# Patient Record
Sex: MALE | Employment: FULL TIME | URBAN - METROPOLITAN AREA
[De-identification: names, ages, dates, MRNs, and addresses within clinical notes are randomized per-mention and may not be internally consistent; named-entity substitution may affect disease eponyms.]

---

## 2020-04-17 PROBLEM — E10.9 TYPE 1 DIABETES MELLITUS WITHOUT COMPLICATION (HCC): Status: ACTIVE | Noted: 2020-04-17

## 2020-07-08 ENCOUNTER — APPOINTMENT (OUTPATIENT)
Dept: CT IMAGING | Facility: HOSPITAL | Age: 25
End: 2020-07-08
Attending: EMERGENCY MEDICINE
Payer: COMMERCIAL

## 2020-07-08 ENCOUNTER — HOSPITAL ENCOUNTER (EMERGENCY)
Facility: HOSPITAL | Age: 25
Discharge: HOME | End: 2020-07-08
Attending: EMERGENCY MEDICINE
Payer: COMMERCIAL

## 2020-07-08 VITALS
WEIGHT: 150 LBS | OXYGEN SATURATION: 95 % | BODY MASS INDEX: 20.32 KG/M2 | TEMPERATURE: 98 F | HEIGHT: 72 IN | RESPIRATION RATE: 18 BRPM | HEART RATE: 58 BPM | SYSTOLIC BLOOD PRESSURE: 126 MMHG | DIASTOLIC BLOOD PRESSURE: 72 MMHG

## 2020-07-08 DIAGNOSIS — F10.10 ALCOHOL ABUSE: ICD-10-CM

## 2020-07-08 DIAGNOSIS — R10.9 ABDOMINAL PAIN OF UNKNOWN ETIOLOGY: Primary | ICD-10-CM

## 2020-07-08 DIAGNOSIS — K92.1 BLOOD IN STOOL: ICD-10-CM

## 2020-07-08 LAB
ALBUMIN SERPL-MCNC: 4.2 G/DL (ref 3.4–5)
ALBUMIN/GLOB SERPL: 1.3 {RATIO} (ref 1–2)
ALP LIVER SERPL-CCNC: 71 U/L (ref 45–117)
ALT SERPL-CCNC: 24 U/L (ref 16–61)
ANION GAP SERPL CALC-SCNC: 1 MMOL/L (ref 0–18)
AST SERPL-CCNC: 11 U/L (ref 15–37)
BASOPHILS # BLD AUTO: 0.07 X10(3) UL (ref 0–0.2)
BASOPHILS NFR BLD AUTO: 1 %
BILIRUB SERPL-MCNC: 0.6 MG/DL (ref 0.1–2)
BILIRUB UR QL STRIP.AUTO: NEGATIVE
BUN BLD-MCNC: 11 MG/DL (ref 7–18)
BUN/CREAT SERPL: 13.4 (ref 10–20)
CALCIUM BLD-MCNC: 9.6 MG/DL (ref 8.5–10.1)
CHLORIDE SERPL-SCNC: 102 MMOL/L (ref 98–112)
CLARITY UR REFRACT.AUTO: CLEAR
CO2 SERPL-SCNC: 32 MMOL/L (ref 21–32)
COLOR UR AUTO: YELLOW
CREAT BLD-MCNC: 0.82 MG/DL (ref 0.7–1.3)
DEPRECATED RDW RBC AUTO: 40.5 FL (ref 35.1–46.3)
EOSINOPHIL # BLD AUTO: 0.12 X10(3) UL (ref 0–0.7)
EOSINOPHIL NFR BLD AUTO: 1.7 %
ERYTHROCYTE [DISTWIDTH] IN BLOOD BY AUTOMATED COUNT: 11.8 % (ref 11–15)
GLOBULIN PLAS-MCNC: 3.3 G/DL (ref 2.8–4.4)
GLUCOSE BLD-MCNC: 200 MG/DL (ref 70–99)
GLUCOSE UR STRIP.AUTO-MCNC: >=500 MG/DL
HCT VFR BLD AUTO: 40.9 % (ref 39–53)
HGB BLD-MCNC: 14.4 G/DL (ref 13–17.5)
IMM GRANULOCYTES # BLD AUTO: 0.04 X10(3) UL (ref 0–1)
IMM GRANULOCYTES NFR BLD: 0.6 %
KETONES UR STRIP.AUTO-MCNC: NEGATIVE MG/DL
LEUKOCYTE ESTERASE UR QL STRIP.AUTO: NEGATIVE
LIPASE SERPL-CCNC: 65 U/L (ref 73–393)
LYMPHOCYTES # BLD AUTO: 2.09 X10(3) UL (ref 1–4)
LYMPHOCYTES NFR BLD AUTO: 30.1 %
M PROTEIN MFR SERPL ELPH: 7.5 G/DL (ref 6.4–8.2)
MCH RBC QN AUTO: 32.7 PG (ref 26–34)
MCHC RBC AUTO-ENTMCNC: 35.2 G/DL (ref 31–37)
MCV RBC AUTO: 92.7 FL (ref 80–100)
MONOCYTES # BLD AUTO: 0.76 X10(3) UL (ref 0.1–1)
MONOCYTES NFR BLD AUTO: 11 %
NEUTROPHILS # BLD AUTO: 3.86 X10 (3) UL (ref 1.5–7.7)
NEUTROPHILS # BLD AUTO: 3.86 X10(3) UL (ref 1.5–7.7)
NEUTROPHILS NFR BLD AUTO: 55.6 %
NITRITE UR QL STRIP.AUTO: NEGATIVE
OSMOLALITY SERPL CALC.SUM OF ELEC: 285 MOSM/KG (ref 275–295)
PH UR STRIP.AUTO: 6 [PH] (ref 4.5–8)
PLATELET # BLD AUTO: 203 10(3)UL (ref 150–450)
POTASSIUM SERPL-SCNC: 3.9 MMOL/L (ref 3.5–5.1)
PROT UR STRIP.AUTO-MCNC: NEGATIVE MG/DL
RBC # BLD AUTO: 4.41 X10(6)UL (ref 4.3–5.7)
RBC UR QL AUTO: NEGATIVE
SODIUM SERPL-SCNC: 135 MMOL/L (ref 136–145)
SP GR UR STRIP.AUTO: 1.01 (ref 1–1.03)
UROBILINOGEN UR STRIP.AUTO-MCNC: <2 MG/DL
WBC # BLD AUTO: 6.9 X10(3) UL (ref 4–11)

## 2020-07-08 PROCEDURE — 74177 CT ABD & PELVIS W/CONTRAST: CPT | Performed by: EMERGENCY MEDICINE

## 2020-07-08 PROCEDURE — 81003 URINALYSIS AUTO W/O SCOPE: CPT | Performed by: EMERGENCY MEDICINE

## 2020-07-08 PROCEDURE — 99284 EMERGENCY DEPT VISIT MOD MDM: CPT

## 2020-07-08 PROCEDURE — 80053 COMPREHEN METABOLIC PANEL: CPT | Performed by: EMERGENCY MEDICINE

## 2020-07-08 PROCEDURE — 76705 ECHO EXAM OF ABDOMEN: CPT

## 2020-07-08 PROCEDURE — 85025 COMPLETE CBC W/AUTO DIFF WBC: CPT | Performed by: EMERGENCY MEDICINE

## 2020-07-08 PROCEDURE — 83690 ASSAY OF LIPASE: CPT | Performed by: EMERGENCY MEDICINE

## 2020-07-08 PROCEDURE — 36415 COLL VENOUS BLD VENIPUNCTURE: CPT

## 2020-07-08 RX ORDER — DICYCLOMINE HCL 20 MG
20 TABLET ORAL 3 TIMES DAILY
Qty: 20 TABLET | Refills: 0 | Status: SHIPPED | OUTPATIENT
Start: 2020-07-08 | End: 2020-07-16

## 2020-07-08 RX ORDER — OMEPRAZOLE 40 MG/1
40 CAPSULE, DELAYED RELEASE ORAL DAILY
Qty: 30 CAPSULE | Refills: 0 | Status: SHIPPED | OUTPATIENT
Start: 2020-07-08 | End: 2020-08-07

## 2020-07-08 NOTE — ED INITIAL ASSESSMENT (HPI)
Patient with c/o bloody stool this AM. Patient has been having generalized abdominal pain x1 month. Vomited x1 on Sunday. Patient admits to drinking heavily, drinks on a \"good day\" 12-14 beers.

## 2020-07-08 NOTE — ED PROVIDER NOTES
Patient Seen in: [facility] Emergency Department      History   Patient presents with:  Abdomen/Flank Pain    Stated Complaint: abdominal pain for a month, bloody stool today.      HPI    Patient is a 25-year-old male comes in emergency room for eval Positive for stated complaint: abdominal pain for a month, bloody stool today. Other systems are as noted in HPI. Constitutional and vital signs reviewed. All other systems reviewed and negative except as noted above.     Physical Exam     ED Triage LIPASE - Abnormal; Notable for the following components:    Lipase 65 (*)     All other components within normal limits   CBC WITH DIFFERENTIAL WITH PLATELET    Narrative:      The following orders were created for panel order CBC WITH DIFFERENTIAL WITH ASHLY PROCEDURE:  CT ABDOMEN PELVIS IV CONTRAST, NO ORAL (ER)  COMPARISON:  None. INDICATIONS:  abdominal pain for a month, bloody stool today. Heavy drinking, sometimes 12-14 servings of beer in 1 day.   TECHNIQUE:  CT scanning was performed from the dome of t Finalized by: Neena Tariq MD on 7/08/2020 at 4:48 PM              First image transverse gallbladder without stones. Second image common bile duct 0.29 cm. Third image longitudinal gallbladder without stones.   GB wall 2mm    Indication:  A Patient is a 61-year-old male comes in emergency room for evaluation of abdominal pain for 2 months. CT scan shows no acute findings. Patient is a heavy drinker. Recommend alcohol cessation. Will be placed on Prilosec. Bentyl for pain at home.   Some b Omeprazole 40 MG Oral Capsule Delayed Release  Take 1 capsule (40 mg total) by mouth daily. Qty: 30 capsule Refills: 0    Dicyclomine HCl 20 MG Oral Tab  Take 1 tablet (20 mg total) by mouth 3 (three) times daily.   Qty: 20 tablet Refills: 0

## 2020-07-22 PROBLEM — E10.9 TYPE 1 DIABETES MELLITUS (HCC): Status: ACTIVE | Noted: 2018-04-26

## 2020-07-22 PROBLEM — F19.21 HISTORY OF DRUG DEPENDENCE (HCC): Status: ACTIVE | Noted: 2018-04-26

## 2021-09-23 PROBLEM — F43.10 PTSD (POST-TRAUMATIC STRESS DISORDER): Status: ACTIVE | Noted: 2021-09-23

## 2025-07-06 ENCOUNTER — APPOINTMENT (OUTPATIENT)
Dept: GENERAL RADIOLOGY | Age: 30
End: 2025-07-06
Payer: COMMERCIAL

## 2025-07-06 ENCOUNTER — HOSPITAL ENCOUNTER (EMERGENCY)
Age: 30
Discharge: HOME | End: 2025-07-06
Attending: EMERGENCY MEDICINE
Payer: COMMERCIAL

## 2025-07-06 VITALS
BODY MASS INDEX: 20.54 KG/M2 | OXYGEN SATURATION: 100 % | WEIGHT: 155 LBS | RESPIRATION RATE: 18 BRPM | SYSTOLIC BLOOD PRESSURE: 121 MMHG | TEMPERATURE: 98 F | HEART RATE: 86 BPM | HEIGHT: 73 IN | DIASTOLIC BLOOD PRESSURE: 74 MMHG
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
## 2025-07-06 DIAGNOSIS — S51.851A DOG BITE OF RIGHT FOREARM, INITIAL ENCOUNTER: Primary | ICD-10-CM

## 2025-07-06 PROCEDURE — 99284 EMERGENCY DEPT VISIT MOD MDM: CPT

## 2025-07-06 PROCEDURE — 12002 RPR S/N/AX/GEN/TRNK2.6-7.5CM: CPT

## 2025-07-06 PROCEDURE — 73090 X-RAY EXAM OF FOREARM: CPT | Performed by: EMERGENCY MEDICINE

## 2025-07-06 PROCEDURE — 90471 IMMUNIZATION ADMIN: CPT

## 2025-07-06 NOTE — DISCHARGE INSTRUCTIONS
Keep wound clean and dry.   Do not get stitches wet for the first 24 hours.   After this wash with soap and water twice a day.   Apply triple antibiotic ointment twice a day for the 3 days.   Then leave open to air as the oxygen will help it to heal.   Take Tylenol and/or ibuprofen as needed for pain.   Have the stitches removed in 10-14 days.   Watch for any increased redness, swelling, or drainage.   Follow up with your PCP in 2-3 days.     Thank you for choosing [facility] for your care.

## 2025-07-06 NOTE — ED PROVIDER NOTES
Patient Seen in: [facility] In [place]        History  Chief Complaint   Patient presents with    Bite     Stated Complaint: dog bite to right arm    Subjective:   29-year-old male presents to the emergency department with complaint of dog bite.  Patient states he was at a friend's house helping with a chore when the dog came out and attacked him.  The friend stated that his dog is up-to-date with immunizations.  Patient states he is unsure when his last tetanus shot was received.  He does have a history of depression, polysubstance dependence, and type 1 diabetes.  He denies any other injury, numbness, or tingling.    The history is provided by the patient.                   Objective:     Past Medical History:    Depression    Polysubstance dependence (HCC)    Type 1 diabetes mellitus (HCC)              Past Surgical History:   Procedure Laterality Date    Hc implant ear tubes      as a child    Other surgical history      Other surgical history      Tonsillectomy                  Social History     Socioeconomic History    Marital status: Single   Tobacco Use    Smoking status: Every Day     Current packs/day: 1.00     Average packs/day: 1 pack/day for 11.0 years (11.0 ttl pk-yrs)     Types: Cigarettes    Smokeless tobacco: Never    Tobacco comments:     smoked since third grade   Vaping Use    Vaping status: Never Used   Substance and Sexual Activity    Alcohol use: Yes     Alcohol/week: 42.0 standard drinks of alcohol     Types: 42 Cans of beer per week    Drug use: Yes     Types: benzodiazepines, Cannabis, PCP     Comment: cocaine,marijuana,abuse of benzodiazepines                                Physical Exam    ED Triage Vitals [07/06/25 1238]   /74   Pulse 86   Resp 18   Temp 97.8 °F (36.6 °C)   Temp src Temporal   SpO2 100 %   O2 Device None (Room air)       Current Vitals:   Vital Signs  BP: 121/74  Pulse: 86  Resp: 18  Temp: 97.8 °F (36.6 °C)  Temp src: Temporal    Oxygen  Therapy  SpO2: 100 %  O2 Device: None (Room air)            Physical Exam  Vitals and nursing note reviewed.   Constitutional:       General: He is not in acute distress.     Appearance: Normal appearance. He is normal weight. He is not ill-appearing.   HENT:      Head: Normocephalic and atraumatic.      Nose: Nose normal.      Mouth/Throat:      Mouth: Mucous membranes are moist.      Pharynx: Oropharynx is clear.   Eyes:      Conjunctiva/sclera: Conjunctivae normal.   Cardiovascular:      Rate and Rhythm: Normal rate and regular rhythm.      Pulses: Normal pulses.      Heart sounds: Normal heart sounds.   Pulmonary:      Effort: Pulmonary effort is normal. No respiratory distress.      Breath sounds: Normal breath sounds.   Musculoskeletal:         General: Swelling, tenderness and signs of injury present. No deformity. Normal range of motion.      Comments: 6 cm jagged and gaping laceration to the right ventral forearm.  This does extend past the subcutaneous tissue but does not affect the muscle.  ROM, motor strength and sensation intact.  Cap refill <2 sec and radial pulse 2+.     Skin:     General: Skin is warm and dry.      Capillary Refill: Capillary refill takes less than 2 seconds.   Neurological:      General: No focal deficit present.      Mental Status: He is alert and oriented to person, place, and time.   Psychiatric:         Mood and Affect: Mood normal.         Behavior: Behavior normal.          ED Course  Labs Reviewed - No data to display    ED Course as of 07/06/25 1424  ------------------------------------------------------------  Time: 07/06 1330  Value: XR FOREARM (2 VIEWS), RIGHT (CPT=73090)  Comment: CONCLUSION:     Negative for fracture or malalignment. Normal mineralization. Joint spaces are preserved. Soft tissue defect noted along the ventral aspect of the midforearm. No radiopaque foreign body.                      MDM       Medical Decision Making  28 yo male with dog bite of right  forearm.  X-ray ordered in triage.  Tetanus and Asepsis ordered.  Verbal consent received for repair.      Laceration was anesthetized with lidocaine with epinephrine locally.  The wound was cleansed and irrigated copiously.  There was no visible foreign body within the wound. The wound was closed using 12 simple interrupted loosely approximated sutures 4-0 Prolene.  The quality of the closure was adequate.  Patient tolerated procedure fair.  Dressing applied.     Augmentin was prescribed for prophylactic of infection.  Note was given for work.  Discussed with patient and parent supportive management at home including wound care.  Recommend patient follow-up with his PCP in the next 2 to 3 days for wound recheck.  Sutures can be removed in 10 to 14 days.      Amount and/or Complexity of Data Reviewed  Radiology: ordered. Decision-making details documented in ED Course.    Risk  OTC drugs.  Prescription drug management.        Disposition and Plan     Clinical Impression:  1. Dog bite of right forearm, initial encounter         Disposition:  Discharge  7/6/2025  2:13 pm    Follow-up:  Slade Yi MD  [address] [place] [id number]  [phone]    Follow up in 2 day(s)  For wound re-check          Medications Prescribed:  Discharge Medication List as of 7/6/2025  2:14 PM        START taking these medications    Details   amoxicillin clavulanate 875-125 MG Oral Tab Take 1 tablet by mouth 2 (two) times daily for 7 days., Normal, Disp-14 tablet, R-0                   Supplementary Documentation:

## 2025-07-08 ENCOUNTER — APPOINTMENT (OUTPATIENT)
Dept: CT IMAGING | Age: 30
End: 2025-07-08
Attending: STUDENT IN AN ORGANIZED HEALTH CARE EDUCATION/TRAINING PROGRAM

## 2025-07-08 ENCOUNTER — HOSPITAL ENCOUNTER (INPATIENT)
Facility: HOSPITAL | Age: 30
LOS: 2 days | Discharge: HOME | End: 2025-07-10
Attending: HOSPITALIST | Admitting: HOSPITALIST

## 2025-07-08 ENCOUNTER — HOSPITAL ENCOUNTER (INPATIENT)
Facility: HOSPITAL | Age: 30
LOS: 2 days | Discharge: HOME | DRG: 603 | End: 2025-07-10
Attending: HOSPITALIST | Admitting: HOSPITALIST

## 2025-07-08 ENCOUNTER — APPOINTMENT (OUTPATIENT)
Dept: CT IMAGING | Age: 30
DRG: 603 | End: 2025-07-08
Attending: STUDENT IN AN ORGANIZED HEALTH CARE EDUCATION/TRAINING PROGRAM

## 2025-07-08 DIAGNOSIS — L02.413 ABSCESS OF RIGHT FOREARM: ICD-10-CM

## 2025-07-08 DIAGNOSIS — L08.9 DOG BITE OF RIGHT FOREARM WITH INFECTION, INITIAL ENCOUNTER: Primary | ICD-10-CM

## 2025-07-08 DIAGNOSIS — S51.851A DOG BITE OF RIGHT FOREARM WITH INFECTION, INITIAL ENCOUNTER: Primary | ICD-10-CM

## 2025-07-08 DIAGNOSIS — Z53.29 LEFT AGAINST MEDICAL ADVICE: ICD-10-CM
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
## 2025-07-08 DIAGNOSIS — L03.113 CELLULITIS OF RIGHT UPPER EXTREMITY: ICD-10-CM

## 2025-07-08 LAB
ALBUMIN SERPL-MCNC: 4.8 G/DL (ref 3.2–4.8)
ALBUMIN/GLOB SERPL: 2 (ref 1–2)
ALP LIVER SERPL-CCNC: 94 U/L (ref 45–117)
ALT SERPL-CCNC: 25 U/L (ref 10–49)
ANION GAP SERPL CALC-SCNC: 7 MMOL/L (ref 0–18)
AST SERPL-CCNC: 22 U/L (ref ?–34)
BASOPHILS # BLD AUTO: 0.03 X10(3) UL (ref 0–0.2)
BASOPHILS NFR BLD AUTO: 0.4 %
BILIRUB SERPL-MCNC: 0.5 MG/DL (ref 0.3–1.2)
BUN BLD-MCNC: 10 MG/DL (ref 9–23)
CALCIUM BLD-MCNC: 9.7 MG/DL (ref 8.7–10.6)
CHLORIDE SERPL-SCNC: 100 MMOL/L (ref 98–112)
CO2 SERPL-SCNC: 30 MMOL/L (ref 21–32)
CREAT BLD-MCNC: 0.85 MG/DL (ref 0.7–1.3)
EGFRCR SERPLBLD CKD-EPI 2021: 121 ML/MIN/1.73M2 (ref 60–?)
EOSINOPHIL # BLD AUTO: 0.07 X10(3) UL (ref 0–0.7)
EOSINOPHIL NFR BLD AUTO: 1 %
ERYTHROCYTE [DISTWIDTH] IN BLOOD BY AUTOMATED COUNT: 12.2 %
GLOBULIN PLAS-MCNC: 2.4 G/DL (ref 2–3.5)
GLUCOSE BLD-MCNC: 165 MG/DL (ref 70–99)
GLUCOSE BLD-MCNC: 376 MG/DL (ref 70–99)
HCT VFR BLD AUTO: 40.4 % (ref 39–53)
HGB BLD-MCNC: 13.4 G/DL (ref 13–17.5)
IMM GRANULOCYTES # BLD AUTO: 0.02 X10(3) UL (ref 0–1)
IMM GRANULOCYTES NFR BLD: 0.3 %
LACTATE SERPL-SCNC: 1.1 MMOL/L (ref 0.5–2)
LACTATE SERPL-SCNC: 2.3 MMOL/L (ref 0.5–2)
LYMPHOCYTES # BLD AUTO: 1.46 X10(3) UL (ref 1–4)
LYMPHOCYTES NFR BLD AUTO: 21.1 %
MCH RBC QN AUTO: 30.5 PG (ref 26–34)
MCHC RBC AUTO-ENTMCNC: 33.2 G/DL (ref 31–37)
MCV RBC AUTO: 91.8 FL (ref 80–100)
MONOCYTES # BLD AUTO: 0.69 X10(3) UL (ref 0.1–1)
MONOCYTES NFR BLD AUTO: 10 %
NEUTROPHILS # BLD AUTO: 4.66 X10 (3) UL (ref 1.5–7.7)
NEUTROPHILS # BLD AUTO: 4.66 X10(3) UL (ref 1.5–7.7)
NEUTROPHILS NFR BLD AUTO: 67.2 %
OSMOLALITY SERPL CALC.SUM OF ELEC: 287 MOSM/KG (ref 275–295)
PLATELET # BLD AUTO: 236 10(3)UL (ref 150–450)
POTASSIUM SERPL-SCNC: 4.4 MMOL/L (ref 3.5–5.1)
PROT SERPL-MCNC: 7.2 G/DL (ref 5.7–8.2)
RBC # BLD AUTO: 4.4 X10(6)UL (ref 4.3–5.7)
SODIUM SERPL-SCNC: 137 MMOL/L (ref 136–145)
WBC # BLD AUTO: 6.9 X10(3) UL (ref 4–11)

## 2025-07-08 PROCEDURE — 96361 HYDRATE IV INFUSION ADD-ON: CPT

## 2025-07-08 PROCEDURE — 83036 HEMOGLOBIN GLYCOSYLATED A1C: CPT | Performed by: HOSPITALIST

## 2025-07-08 PROCEDURE — 36415 COLL VENOUS BLD VENIPUNCTURE: CPT

## 2025-07-08 PROCEDURE — 85025 COMPLETE CBC W/AUTO DIFF WBC: CPT | Performed by: NURSE PRACTITIONER

## 2025-07-08 PROCEDURE — 73201 CT UPPER EXTREMITY W/DYE: CPT | Performed by: STUDENT IN AN ORGANIZED HEALTH CARE EDUCATION/TRAINING PROGRAM

## 2025-07-08 PROCEDURE — 87040 BLOOD CULTURE FOR BACTERIA: CPT | Performed by: NURSE PRACTITIONER

## 2025-07-08 PROCEDURE — 96365 THER/PROPH/DIAG IV INF INIT: CPT

## 2025-07-08 PROCEDURE — 99285 EMERGENCY DEPT VISIT HI MDM: CPT

## 2025-07-08 PROCEDURE — 80053 COMPREHEN METABOLIC PANEL: CPT | Performed by: NURSE PRACTITIONER

## 2025-07-08 PROCEDURE — 82962 GLUCOSE BLOOD TEST: CPT

## 2025-07-08 PROCEDURE — 83605 ASSAY OF LACTIC ACID: CPT | Performed by: NURSE PRACTITIONER

## 2025-07-08 PROCEDURE — 96375 TX/PRO/DX INJ NEW DRUG ADDON: CPT

## 2025-07-08 RX ORDER — POLYETHYLENE GLYCOL 3350 17 G/17G
17 POWDER, FOR SOLUTION ORAL DAILY PRN
Status: DISCONTINUED | OUTPATIENT
Start: 2025-07-08 | End: 2025-07-10

## 2025-07-08 RX ORDER — DEXTROSE MONOHYDRATE 25 G/50ML
50 INJECTION, SOLUTION INTRAVENOUS
Status: DISCONTINUED | OUTPATIENT
Start: 2025-07-08 | End: 2025-07-10

## 2025-07-08 RX ORDER — ONDANSETRON 2 MG/ML
4 INJECTION INTRAMUSCULAR; INTRAVENOUS EVERY 4 HOURS PRN
Status: DISCONTINUED | OUTPATIENT
Start: 2025-07-08 | End: 2025-07-08 | Stop reason: ALTCHOICE

## 2025-07-08 RX ORDER — MORPHINE SULFATE 4 MG/ML
4 INJECTION, SOLUTION INTRAMUSCULAR; INTRAVENOUS
Status: DISCONTINUED | OUTPATIENT
Start: 2025-07-08 | End: 2025-07-08 | Stop reason: ALTCHOICE

## 2025-07-08 RX ORDER — PROCHLORPERAZINE EDISYLATE 5 MG/ML
5 INJECTION INTRAMUSCULAR; INTRAVENOUS EVERY 8 HOURS PRN
Status: DISCONTINUED | OUTPATIENT
Start: 2025-07-08 | End: 2025-07-10

## 2025-07-08 RX ORDER — ACETAMINOPHEN 500 MG
500 TABLET ORAL EVERY 4 HOURS PRN
Status: DISCONTINUED | OUTPATIENT
Start: 2025-07-08 | End: 2025-07-10

## 2025-07-08 RX ORDER — ONDANSETRON 2 MG/ML
4 INJECTION INTRAMUSCULAR; INTRAVENOUS EVERY 6 HOURS PRN
Status: DISCONTINUED | OUTPATIENT
Start: 2025-07-08 | End: 2025-07-10

## 2025-07-08 RX ORDER — SODIUM PHOSPHATE, DIBASIC AND SODIUM PHOSPHATE, MONOBASIC 7; 19 G/230ML; G/230ML
1 ENEMA RECTAL ONCE AS NEEDED
Status: DISCONTINUED | OUTPATIENT
Start: 2025-07-08 | End: 2025-07-10

## 2025-07-08 RX ORDER — NICOTINE POLACRILEX 4 MG
15 LOZENGE BUCCAL
Status: DISCONTINUED | OUTPATIENT
Start: 2025-07-08 | End: 2025-07-10

## 2025-07-08 RX ORDER — HEPARIN SODIUM 5000 [USP'U]/ML
5000 INJECTION, SOLUTION INTRAVENOUS; SUBCUTANEOUS EVERY 8 HOURS SCHEDULED
Status: DISCONTINUED | OUTPATIENT
Start: 2025-07-08 | End: 2025-07-10

## 2025-07-08 RX ORDER — NICOTINE POLACRILEX 4 MG
30 LOZENGE BUCCAL
Status: DISCONTINUED | OUTPATIENT
Start: 2025-07-08 | End: 2025-07-10

## 2025-07-08 RX ORDER — SENNOSIDES 8.6 MG
17.2 TABLET ORAL NIGHTLY PRN
Status: DISCONTINUED | OUTPATIENT
Start: 2025-07-08 | End: 2025-07-10

## 2025-07-08 RX ORDER — BISACODYL 10 MG
10 SUPPOSITORY, RECTAL RECTAL
Status: DISCONTINUED | OUTPATIENT
Start: 2025-07-08 | End: 2025-07-10

## 2025-07-08 RX ORDER — KETOROLAC TROMETHAMINE 30 MG/ML
30 INJECTION, SOLUTION INTRAMUSCULAR; INTRAVENOUS ONCE
Status: COMPLETED | OUTPATIENT
Start: 2025-07-08 | End: 2025-07-08

## 2025-07-08 RX ORDER — KETOROLAC TROMETHAMINE 30 MG/ML
30 INJECTION, SOLUTION INTRAMUSCULAR; INTRAVENOUS EVERY 6 HOURS PRN
Status: DISPENSED | OUTPATIENT
Start: 2025-07-08 | End: 2025-07-09

## 2025-07-08 RX ORDER — ATORVASTATIN CALCIUM 10 MG/1
10 TABLET, FILM COATED ORAL DAILY
COMMUNITY

## 2025-07-08 NOTE — ED PROVIDER NOTES
Patient Seen in: [facility] In [place]        History  Chief Complaint   Patient presents with    Cellulitis     Stated Complaint: Wound check    Subjective:   29-year-old male presents to the emergency department with complaint of arm redness.  Patient was seen here on Sunday for a dog bite to the right forearm.  Sutures were placed to the arm to close the wound.  Patient was also sent home on antibiotics.  Patient states he has been taking antibiotics as prescribed.  However, his last night he noticed that the arm felt more swollen and painful.  Today he noticed that there is more redness to the upper forearm and under the bite.  Patient does have a history of depression, type 1 diabetes, and polysubstance dependence.  He has not taken anything for pain today.  He denies any fever, chills, or exudates.    The history is provided by the patient.                   Objective:     Past Medical History:    Depression    Polysubstance dependence (HCC)    Type 1 diabetes mellitus (HCC)              Past Surgical History:   Procedure Laterality Date    Hc implant ear tubes      as a child    Other surgical history      Other surgical history      Tonsillectomy                  Social History     Socioeconomic History    Marital status: Single   Tobacco Use    Smoking status: Former     Current packs/day: 1.00     Average packs/day: 1 pack/day for 11.0 years (11.0 ttl pk-yrs)     Types: Cigarettes    Smokeless tobacco: Never    Tobacco comments:     smoked since third grade   Vaping Use    Vaping status: Every Day   Substance and Sexual Activity    Alcohol use: Yes     Alcohol/week: 42.0 standard drinks of alcohol     Types: 42 Cans of beer per week    Drug use: Yes     Types: benzodiazepines, Cannabis, PCP     Comment: cocaine,marijuana,abuse of benzodiazepines                                Physical Exam    ED Triage Vitals [07/08/25 1322]   /75   Pulse 86   Resp 16   Temp 98.9 °F (37.2 °C)    Temp src Temporal   SpO2 98 %   O2 Device None (Room air)       Current Vitals:   Vital Signs  BP: 131/73  Pulse: 79  Resp: 16  Temp: 98.9 °F (37.2 °C)  Temp src: Temporal    Oxygen Therapy  SpO2: 98 %  O2 Device: None (Room air)            Physical Exam  Vitals and nursing note reviewed.   Constitutional:       General: He is not in acute distress.     Appearance: Normal appearance. He is normal weight. He is not ill-appearing.   HENT:      Head: Normocephalic and atraumatic.   Eyes:      Conjunctiva/sclera: Conjunctivae normal.      Pupils: Pupils are equal, round, and reactive to light.   Cardiovascular:      Rate and Rhythm: Normal rate and regular rhythm.      Pulses: Normal pulses.      Heart sounds: Normal heart sounds.   Pulmonary:      Effort: Pulmonary effort is normal. No respiratory distress.      Breath sounds: Normal breath sounds.   Musculoskeletal:         General: Normal range of motion.   Skin:     General: Skin is warm and dry.      Capillary Refill: Capillary refill takes less than 2 seconds.      Findings: Erythema present.      Comments: Right forearm is significantly edematous and erythematous. Erythema does not extend past the wrist or elbow.  However, it has moved more medially on the forearm.  Tender to palpation.  Sutures are intact.  No exudates.  Sensation deficits with some numbness to distal radial forearm.  Cap refill <2 sec and radial pulse 2+/    Neurological:      General: No focal deficit present.      Mental Status: He is alert and oriented to person, place, and time.   Psychiatric:         Mood and Affect: Mood normal.         Behavior: Behavior normal.               ED Course  Labs Reviewed   COMP METABOLIC PANEL (14) - Abnormal; Notable for the following components:       Result Value    Glucose 165 (*)     All other components within normal limits   LACTIC ACID, PLASMA - Abnormal; Notable for the following components:    Lactic Acid 2.3 (*)     All other components within  normal limits   CBC WITH DIFFERENTIAL WITH PLATELET   LACTIC ACID REFLEX POST POSTIVE   BLOOD CULTURE   BLOOD CULTURE       ED Course as of 07/08/25 1551  ------------------------------------------------------------  Time: 07/08 1539  Value: CBC With Differential With Platelet  Comment: No elevated WBC or leukocytosis.  H&H is stable at 13.4 and 40.4.  ------------------------------------------------------------  Time: 07/08 1539  Value: Comp Metabolic Panel (14)(!)  Comment: Glucose of 165 and a known type I diabetic.  All other labs are unremarkable and essentially normal.  ------------------------------------------------------------  Time: 07/08 1539  Value: Lactic Acid, Plasma(!)  Comment: Lactic acid is elevated at 2.3.  IV fluids were ordered.  ------------------------------------------------------------  Time: 07/08 1544  Comment: Spoke with Dr. Oneal on call for orthopedics via [employer].  He will consult and see patient tomorrow morning.  Requests NPO after midnight in case needs wash out.                       MDM       Medical Decision Making  28 yo male with infected dog bite to the right forearm.  CBC, CMP, Lactic acid, blood cultures x2, IV fluids, Toradol and Unasyn ordered.     CT scan of right upper extremity to r/o abscess added by Dr. DAVID Kamara.     See ED course for labs.  Dr. Kamara to review CT scan and admit patient to [facility].  Spoke with Dr. Oneal via [employer] regarding orthopedic consult.  He will see patient tomorrow morning and requested pictures and NPO after midnight in case patient requires surgery.      Amount and/or Complexity of Data Reviewed  Labs: ordered. Decision-making details documented in ED Course.  Radiology: ordered. Decision-making details documented in ED Course.    Risk  Decision regarding hospitalization.        Disposition and Plan     Clinical Impression:  1. Dog bite of right forearm with infection, initial encounter         Disposition:  There is no  disposition on file for this visit.  There is no disposition time on file for this visit.    Follow-up:  No follow-up provider specified.        Medications Prescribed:  Current Discharge Medication List                Supplementary Documentation:

## 2025-07-08 NOTE — H&P
.CC:   Chief Complaint   Patient presents with    Cellulitis        PCP: Slade Yi MD    History of Present Illness: Patient is a 29 year old male with PMH sig for DM on insulin pump who presented with dog bite on R forearm. Was seen in ED on 7/6, wound irrigated/loosely closed and augmentin prescribed, received tetanus. He returned due to worsening swelling, R forearm redness. No fevers. No sob/cp/dizziness    Noted to have erythema of R forearm extending up to R arm. Ortho to see, CT forearm done. Given IV abx, IVF, toradol. Lactic 2.3--> 1.1      PMH  Past Medical History:    Depression    Polysubstance dependence (HCC)    Type 1 diabetes mellitus (HCC)        PSH  Past Surgical History[1]     ALL:  Allergies[2]     Home Medications:  Medications Taking[3]      Soc Hx  Social History     Tobacco Use    Smoking status: Former     Current packs/day: 1.00     Average packs/day: 1 pack/day for 11.0 years (11.0 ttl pk-yrs)     Types: Cigarettes    Smokeless tobacco: Never    Tobacco comments:     smoked since third grade   Substance Use Topics    Alcohol use: Yes     Alcohol/week: 42.0 standard drinks of alcohol     Types: 42 Cans of beer per week        Fam Hx  Family History[4]    Review of Systems  Comprehensive ROS reviewed and negative except for what's stated above.       OBJECTIVE:  /73   Pulse 79   Temp 98.9 °F (37.2 °C) (Temporal)   Resp 16   Ht 6' 1\" (1.854 m)   Wt 155 lb (70.3 kg)   SpO2 98%   BMI 20.45 kg/m²     Gen- NAD, appears stated age  HEENT- NCAT, anicteric sclera, MMM, OP clear  Lymph- no cervical LAD  CV- RRR no murmurs. No JOSE GUADALUPE  Lungs- CTAB, good respiratory effort  Abd- soft, ntnd, no organomegaly, BS+  Derm/msk- R forearm with swelling, wound bandaged. Able to move R fingers. Good radial pulse  Neuro- A&OX3, no focal deficits      Diagnostic Data:    CBC/Chem  Recent Labs   Lab 07/08/25  1355   WBC 6.9   HGB 13.4   MCV 91.8   .0       Recent Labs   Lab 07/08/25  1355       K 4.4      CO2 30.0   BUN 10   CREATSERUM 0.85   *   CA 9.7       Recent Labs   Lab 07/08/25  1355   ALT 25   AST 22   ALB 4.8       No results for input(s): \"TROP\" in the last 168 hours.        Radiology: XR FOREARM (2 VIEWS), RIGHT (CPT=73090)  Result Date: 7/6/2025  PROCEDURE: XR FOREARM (2 VIEWS), RIGHT (CPT=73090) INDICATIONS: dog bite to right arm PATIENT STATED HISTORY: Pt was it by dog today, he c/o bite on the lateral aspect just distal to the elbow joint. COMPARISON: There are no comparisons for this exam.     CONCLUSION: Negative for fracture or malalignment. Normal mineralization. Joint spaces are preserved. Soft tissue defect noted along the ventral aspect of the midforearm. No radiopaque foreign body. Electronically Verified and Signed by Attending Radiologist: Jose Eduardo De Luna MD 7/6/2025 1:22 PM Workstation: [id number]       Available outpatient records reviewed--    ASSESSMENT / PLAN:   28 yo man with R forearm dog bite, concern for purulent infection despite oral abx.    R forearm infection/laceration drainage in setting of dog bite on 7/6  - ortho consulted  - planning or for I&D today  - cont IV unasyn  - blood cultures pending  - lactic acid normalized      DM- on insulin pump. Hgba1c 8.6    H/o etoh abuse- has been abstinent from etoh    Tob use- vapes regularly, asks for nicotine gum. As pt is npo, nicotine patch is recommended    Anxiety/depression- ordered home med    Subcutaneous heparin      Svetlana Treviño MD  [facility]  Pager [phone]  Answering Service number: [phone]         [1]   Past Surgical History:  Procedure Laterality Date    Hc implant ear tubes      as a child    Other surgical history      Other surgical history      Tonsillectomy     [2] No Known Allergies  [3]   No outpatient medications have been marked as taking for the 7/8/25 encounter (Hospital Encounter).   [4]   Family History  Problem Relation Age of Onset    Depression Father      Substance Abuse Father     Substance Abuse Sister     Cancer Paternal Aunt     Alcohol and Other Disorders Associated Paternal Aunt     Alcohol and Other Disorders Associated Paternal Uncle

## 2025-07-09 ENCOUNTER — ANESTHESIA EVENT (OUTPATIENT)
Dept: SURGERY | Facility: HOSPITAL | Age: 30
End: 2025-07-09
Payer: COMMERCIAL

## 2025-07-09 ENCOUNTER — ANESTHESIA (OUTPATIENT)
Dept: SURGERY | Facility: HOSPITAL | Age: 30
End: 2025-07-09
Payer: COMMERCIAL

## 2025-07-09 LAB
EST. AVERAGE GLUCOSE BLD GHB EST-MCNC: 200 MG/DL (ref 68–126)
GLUCOSE BLD-MCNC: 102 MG/DL (ref 70–99)
GLUCOSE BLD-MCNC: 134 MG/DL (ref 70–99)
GLUCOSE BLD-MCNC: 162 MG/DL (ref 70–99)
GLUCOSE BLD-MCNC: 552 MG/DL (ref 70–99)
GLUCOSE BLD-MCNC: 99 MG/DL (ref 70–99)
HBA1C MFR BLD: 8.6 % (ref ?–5.7)

## 2025-07-09 PROCEDURE — 87176 TISSUE HOMOGENIZATION CULTR: CPT | Performed by: ORTHOPAEDIC SURGERY

## 2025-07-09 PROCEDURE — 87075 CULTR BACTERIA EXCEPT BLOOD: CPT | Performed by: ORTHOPAEDIC SURGERY

## 2025-07-09 PROCEDURE — 87077 CULTURE AEROBIC IDENTIFY: CPT | Performed by: ORTHOPAEDIC SURGERY

## 2025-07-09 PROCEDURE — 0KD70ZZ EXTRACTION OF RIGHT UPPER ARM MUSCLE, OPEN APPROACH: ICD-10-PCS | Performed by: ORTHOPAEDIC SURGERY

## 2025-07-09 PROCEDURE — 82962 GLUCOSE BLOOD TEST: CPT

## 2025-07-09 PROCEDURE — 87070 CULTURE OTHR SPECIMN AEROBIC: CPT | Performed by: ORTHOPAEDIC SURGERY

## 2025-07-09 PROCEDURE — 87205 SMEAR GRAM STAIN: CPT | Performed by: ORTHOPAEDIC SURGERY

## 2025-07-09 RX ORDER — ONDANSETRON 2 MG/ML
4 INJECTION INTRAMUSCULAR; INTRAVENOUS EVERY 6 HOURS PRN
Status: DISCONTINUED | OUTPATIENT
Start: 2025-07-09 | End: 2025-07-09 | Stop reason: HOSPADM

## 2025-07-09 RX ORDER — HYDROMORPHONE HYDROCHLORIDE 1 MG/ML
INJECTION, SOLUTION INTRAMUSCULAR; INTRAVENOUS; SUBCUTANEOUS
Status: COMPLETED
Start: 2025-07-09 | End: 2025-07-09

## 2025-07-09 RX ORDER — ONDANSETRON 2 MG/ML
4 INJECTION INTRAMUSCULAR; INTRAVENOUS EVERY 6 HOURS PRN
Status: DISCONTINUED | OUTPATIENT
Start: 2025-07-09 | End: 2025-07-09

## 2025-07-09 RX ORDER — DOCUSATE SODIUM 100 MG/1
100 CAPSULE, LIQUID FILLED ORAL 2 TIMES DAILY
Status: DISCONTINUED | OUTPATIENT
Start: 2025-07-09 | End: 2025-07-10

## 2025-07-09 RX ORDER — HYDROMORPHONE HYDROCHLORIDE 1 MG/ML
0.6 INJECTION, SOLUTION INTRAMUSCULAR; INTRAVENOUS; SUBCUTANEOUS EVERY 5 MIN PRN
Status: DISCONTINUED | OUTPATIENT
Start: 2025-07-09 | End: 2025-07-09 | Stop reason: HOSPADM

## 2025-07-09 RX ORDER — SODIUM PHOSPHATE, DIBASIC AND SODIUM PHOSPHATE, MONOBASIC 7; 19 G/230ML; G/230ML
1 ENEMA RECTAL ONCE AS NEEDED
Status: DISCONTINUED | OUTPATIENT
Start: 2025-07-09 | End: 2025-07-10

## 2025-07-09 RX ORDER — SODIUM CHLORIDE, SODIUM LACTATE, POTASSIUM CHLORIDE, CALCIUM CHLORIDE 600; 310; 30; 20 MG/100ML; MG/100ML; MG/100ML; MG/100ML
INJECTION, SOLUTION INTRAVENOUS CONTINUOUS
Status: DISCONTINUED | OUTPATIENT
Start: 2025-07-09 | End: 2025-07-09 | Stop reason: HOSPADM

## 2025-07-09 RX ORDER — DIPHENHYDRAMINE HYDROCHLORIDE 50 MG/ML
12.5 INJECTION, SOLUTION INTRAMUSCULAR; INTRAVENOUS AS NEEDED
Status: DISCONTINUED | OUTPATIENT
Start: 2025-07-09 | End: 2025-07-09 | Stop reason: HOSPADM

## 2025-07-09 RX ORDER — HYDROMORPHONE HYDROCHLORIDE 1 MG/ML
0.2 INJECTION, SOLUTION INTRAMUSCULAR; INTRAVENOUS; SUBCUTANEOUS EVERY 5 MIN PRN
Status: DISCONTINUED | OUTPATIENT
Start: 2025-07-09 | End: 2025-07-09 | Stop reason: HOSPADM

## 2025-07-09 RX ORDER — HYDROMORPHONE HYDROCHLORIDE 1 MG/ML
0.4 INJECTION, SOLUTION INTRAMUSCULAR; INTRAVENOUS; SUBCUTANEOUS EVERY 5 MIN PRN
Status: DISCONTINUED | OUTPATIENT
Start: 2025-07-09 | End: 2025-07-09 | Stop reason: HOSPADM

## 2025-07-09 RX ORDER — NICOTINE 21 MG/24HR
1 PATCH, TRANSDERMAL 24 HOURS TRANSDERMAL DAILY
Status: DISCONTINUED | OUTPATIENT
Start: 2025-07-09 | End: 2025-07-10

## 2025-07-09 RX ORDER — SODIUM CHLORIDE, SODIUM LACTATE, POTASSIUM CHLORIDE, CALCIUM CHLORIDE 600; 310; 30; 20 MG/100ML; MG/100ML; MG/100ML; MG/100ML
INJECTION, SOLUTION INTRAVENOUS CONTINUOUS PRN
Status: DISCONTINUED | OUTPATIENT
Start: 2025-07-09 | End: 2025-07-09 | Stop reason: SURG

## 2025-07-09 RX ORDER — PROCHLORPERAZINE EDISYLATE 5 MG/ML
5 INJECTION INTRAMUSCULAR; INTRAVENOUS EVERY 8 HOURS PRN
Status: DISCONTINUED | OUTPATIENT
Start: 2025-07-09 | End: 2025-07-09 | Stop reason: HOSPADM

## 2025-07-09 RX ORDER — MEPERIDINE HYDROCHLORIDE 25 MG/ML
12.5 INJECTION INTRAMUSCULAR; INTRAVENOUS; SUBCUTANEOUS AS NEEDED
Status: DISCONTINUED | OUTPATIENT
Start: 2025-07-09 | End: 2025-07-09 | Stop reason: HOSPADM

## 2025-07-09 RX ORDER — ATORVASTATIN CALCIUM 10 MG/1
10 TABLET, FILM COATED ORAL DAILY
Status: DISCONTINUED | OUTPATIENT
Start: 2025-07-09 | End: 2025-07-10

## 2025-07-09 RX ORDER — BUPIVACAINE HYDROCHLORIDE AND EPINEPHRINE 5; 5 MG/ML; UG/ML
INJECTION, SOLUTION EPIDURAL; INTRACAUDAL; PERINEURAL AS NEEDED
Status: DISCONTINUED | OUTPATIENT
Start: 2025-07-09 | End: 2025-07-09 | Stop reason: HOSPADM

## 2025-07-09 RX ORDER — NALOXONE HYDROCHLORIDE 0.4 MG/ML
0.08 INJECTION, SOLUTION INTRAMUSCULAR; INTRAVENOUS; SUBCUTANEOUS AS NEEDED
Status: DISCONTINUED | OUTPATIENT
Start: 2025-07-09 | End: 2025-07-09 | Stop reason: HOSPADM

## 2025-07-09 RX ORDER — DOXEPIN HYDROCHLORIDE 50 MG/1
1 CAPSULE ORAL DAILY
Status: DISCONTINUED | OUTPATIENT
Start: 2025-07-10 | End: 2025-07-10

## 2025-07-09 RX ORDER — POLYETHYLENE GLYCOL 3350 17 G/17G
17 POWDER, FOR SOLUTION ORAL DAILY PRN
Status: DISCONTINUED | OUTPATIENT
Start: 2025-07-09 | End: 2025-07-09

## 2025-07-09 RX ORDER — PROCHLORPERAZINE EDISYLATE 5 MG/ML
5 INJECTION INTRAMUSCULAR; INTRAVENOUS EVERY 8 HOURS PRN
Status: DISCONTINUED | OUTPATIENT
Start: 2025-07-09 | End: 2025-07-09

## 2025-07-09 RX ORDER — BISACODYL 10 MG
10 SUPPOSITORY, RECTAL RECTAL
Status: DISCONTINUED | OUTPATIENT
Start: 2025-07-09 | End: 2025-07-09

## 2025-07-09 RX ORDER — MIDAZOLAM HYDROCHLORIDE 1 MG/ML
1 INJECTION INTRAMUSCULAR; INTRAVENOUS EVERY 5 MIN PRN
Status: DISCONTINUED | OUTPATIENT
Start: 2025-07-09 | End: 2025-07-09 | Stop reason: HOSPADM

## 2025-07-09 RX ORDER — DEXAMETHASONE SODIUM PHOSPHATE 4 MG/ML
VIAL (ML) INJECTION AS NEEDED
Status: DISCONTINUED | OUTPATIENT
Start: 2025-07-09 | End: 2025-07-09 | Stop reason: SURG

## 2025-07-09 RX ORDER — SENNOSIDES 8.6 MG
17.2 TABLET ORAL NIGHTLY
Status: DISCONTINUED | OUTPATIENT
Start: 2025-07-09 | End: 2025-07-10

## 2025-07-09 RX ORDER — LIDOCAINE HYDROCHLORIDE 10 MG/ML
INJECTION, SOLUTION EPIDURAL; INFILTRATION; INTRACAUDAL; PERINEURAL AS NEEDED
Status: DISCONTINUED | OUTPATIENT
Start: 2025-07-09 | End: 2025-07-09 | Stop reason: SURG

## 2025-07-09 RX ORDER — KETOROLAC TROMETHAMINE 30 MG/ML
30 INJECTION, SOLUTION INTRAMUSCULAR; INTRAVENOUS EVERY 6 HOURS PRN
Status: DISCONTINUED | OUTPATIENT
Start: 2025-07-09 | End: 2025-07-10

## 2025-07-09 RX ADMIN — LIDOCAINE HYDROCHLORIDE 50 MG: 10 INJECTION, SOLUTION EPIDURAL; INFILTRATION; INTRACAUDAL; PERINEURAL at 14:54:00

## 2025-07-09 RX ADMIN — SODIUM CHLORIDE, SODIUM LACTATE, POTASSIUM CHLORIDE, CALCIUM CHLORIDE: 600; 310; 30; 20 INJECTION, SOLUTION INTRAVENOUS at 14:50:00

## 2025-07-09 RX ADMIN — ONDANSETRON 4 MG: 2 INJECTION INTRAMUSCULAR; INTRAVENOUS at 15:15:00

## 2025-07-09 RX ADMIN — DEXTROSE MONOHYDRATE 25 ML: 25 INJECTION, SOLUTION INTRAVENOUS at 14:50:00

## 2025-07-09 RX ADMIN — DEXAMETHASONE SODIUM PHOSPHATE 4 MG: 4 MG/ML VIAL (ML) INJECTION at 15:15:00

## 2025-07-09 RX ADMIN — SODIUM CHLORIDE, SODIUM LACTATE, POTASSIUM CHLORIDE, CALCIUM CHLORIDE: 600; 310; 30; 20 INJECTION, SOLUTION INTRAVENOUS at 15:49:00

## 2025-07-09 NOTE — ANESTHESIA POSTPROCEDURE EVALUATION
[facility]    Edi Aguirre Patient Status:  Inpatient   Age/Gender 29 year old male MRN [id number]   Location [facility] SURGERY Attending Svetlana Treviño MD   Hosp Day # 1 PCP Slade Yi MD       Anesthesia Post-op Note    RIGHT FOREARM IRRIGATION & DEBRIDEMENT    Procedure Summary       Date: 07/09/25 Room / Location:  MAIN OR  /  MAIN OR    Anesthesia Start: 1450 Anesthesia Stop:     Procedure: RIGHT FOREARM IRRIGATION & DEBRIDEMENT (Right: Lower Arm) Diagnosis:       Abscess of right forearm      (Abscess of right forearm [L02.413])    Surgeons: Matthew Oneal MD Anesthesiologist: Jeff Smith MD    Anesthesia Type: general ASA Status: 2            Anesthesia Type: general    Vitals Value Taken Time   /84 07/09/25 15:57   Temp 99.0 07/09/25 15:57   Pulse 89 07/09/25 15:57   Resp 16 07/09/25 15:57   SpO2 99 07/09/25 15:57           Patient Location: PACU    Anesthesia Type: general    Airway Patency: patent and extubated    Postop Pain Control: adequate    Mental Status: mildly sedated but able to meaningfully participate in the post-anesthesia evaluation    Nausea/Vomiting: none    Cardiopulmonary/Hydration status: stable euvolemic    Complications: no apparent anesthesia related complications    Postop vital signs: stable    Dental Exam: Unchanged from Preop    Patient to be discharged from PACU when criteria met.

## 2025-07-09 NOTE — CONSULTS
[facility]   part of [facility]    Report of Consultation    Edi Aguirre Patient Status:  Inpatient    12/10/1995 MRN [id number]   Location [facility] 3NE-A Attending Svetlana Treviño MD   Hosp Day # 1 PCP Slade Yi MD     Date of Admission:  2025  Date of Consult:  25    Reason for Consultation:  Right forearm wound    History of Present Illness:  Edi Aguirre is a a(n) 29 year old male who returned for evaluation following dog bite to right forearm on . Seen at ED and wound irrigated with closure and oral Abx. Worsening pain and discharge, returned to ED and admitted for IV abx. Improved redness overnight, but continued purulent drainage from laceration.    History:  Past Medical History[1]  Past Surgical History[2]  Family History[3]   reports that he has quit smoking. His smoking use included cigarettes. He has a 11 pack-year smoking history. He has never used smokeless tobacco. He reports current alcohol use of about 42.0 standard drinks of alcohol per week. He reports current drug use. Drugs: benzodiazepines, Cannabis, and PCP.    Allergies:  Allergies[4]    Medications:  Current Hospital Medications[5]    Physical Exam:    General: Alert, orientated x3.  Cooperative.  No apparent distress.  Vital Signs:  Blood pressure 126/71, pulse 71, temperature 97.7 °F (36.5 °C), temperature source Oral, resp. rate 18, height 6' 1\" (1.854 m), weight 161 lb 9.6 oz (73.3 kg), SpO2 99%.  RUE:  Forearm laceration approximated with nylon suture. No dehiscence. Small area of palpable fluctuance with expressible purulence. NVI distally.    Laboratory Data:  Lactate 2.3 on arrival, improved with IV fluids    Impression and Plan:  Problem List[6]    28 yo M with R forearm laceration drainage following dog bite on  that failed oral abx    - Discussed with patient that given area of fluctuance and persistent purulent drainage, most appropriate to proceed with forearm I&D today  -  NPO for OR  - Continue IV abx  - Further recommendations pending intra-operative findings    Matthew Oneal MD  7/9/2025  6:05 AM       [1]   Past Medical History:   Depression    Polysubstance dependence (HCC)    Type 1 diabetes mellitus (HCC)   [2]   Past Surgical History:  Procedure Laterality Date    Hc implant ear tubes      as a child    Other surgical history      Other surgical history      Tonsillectomy     [3]   Family History  Problem Relation Age of Onset    Depression Father     Substance Abuse Father     Substance Abuse Sister     Cancer Paternal Aunt     Alcohol and Other Disorders Associated Paternal Aunt     Alcohol and Other Disorders Associated Paternal Uncle    [4] No Known Allergies  [5]   Current Facility-Administered Medications:     ampicillin-sulbactam (Unasyn) 3 g in sodium chloride 0.9% 100mL IVPB-JACOBO, 3 g, Intravenous, q6h    heparin (Porcine) 5000 UNIT/ML injection 5,000 Units, 5,000 Units, Subcutaneous, Q8H LESLEE    acetaminophen (Tylenol Extra Strength) tab 500 mg, 500 mg, Oral, Q4H PRN    polyethylene glycol (PEG 3350) (Miralax) 17 g oral packet 17 g, 17 g, Oral, Daily PRN    sennosides (Senokot) tab 17.2 mg, 17.2 mg, Oral, Nightly PRN    bisacodyl (Dulcolax) 10 MG rectal suppository 10 mg, 10 mg, Rectal, Daily PRN    fleet enema (Fleet) rectal enema 133 mL, 1 enema, Rectal, Once PRN    ondansetron (Zofran) 4 MG/2ML injection 4 mg, 4 mg, Intravenous, Q6H PRN    prochlorperazine (Compazine) 10 MG/2ML injection 5 mg, 5 mg, Intravenous, Q8H PRN    ketorolac (Toradol) 30 MG/ML injection 30 mg, 30 mg, Intravenous, Q6H PRN    glucose (Dex4) 15 GM/59ML oral liquid 15 g, 15 g, Oral, Q15 Min PRN **OR** glucose (Glutose) 40% oral gel 15 g, 15 g, Oral, Q15 Min PRN **OR** glucose-vitamin C (Dex-4) chewable tab 4 tablet, 4 tablet, Oral, Q15 Min PRN **OR** dextrose 50% injection 50 mL, 50 mL, Intravenous, Q15 Min PRN **OR** glucose (Dex4) 15 GM/59ML oral liquid 30 g, 30 g, Oral, Q15 Min PRN **OR**  glucose (Glutose) 40% oral gel 30 g, 30 g, Oral, Q15 Min PRN **OR** glucose-vitamin C (Dex-4) chewable tab 8 tablet, 8 tablet, Oral, Q15 Min PRN    nicotine polacrilex (Nicorette) gum 2 mg, 2 mg, Oral, Q1H PRN    insulin via Insulin Pump, , Subcutaneous, TID AC and HS  [6]   Patient Active Problem List  Diagnosis    Generalized anxiety disorder    Major depressive disorder, recurrent episode, moderate (HCC)    Type 1 diabetes mellitus (HCC)    History of drug dependence (HCC)    Tobacco user    PTSD (post-traumatic stress disorder)    Dog bite of right forearm with infection, initial encounter    Cellulitis of right upper extremity    Left against medical advice

## 2025-07-09 NOTE — PROGRESS NOTES
NURSING ADMISSION NOTE      Patient admitted via Cart  Oriented to room.  Safety precautions initiated.  Bed in low position.  Call light in reach.    Pt oriented x 4   Vitals stable, room air  Not on tele   Iv antibiotics scheduled   Prn Toradol for pain management   RFA with dressing c/d/I   Pt updated on poc and verbalizes understanding

## 2025-07-09 NOTE — OPERATIVE REPORT
[facility]   part of [facility]    Operative Note         Edi Aguirre Location: OR   [facility] [id number] MRN [id number]   Admission Date 7/8/2025 Operation Date 7/9/2025   Attending Physician Svetlana Treviño MD       Patient Name: Edi Aguirre     Preoperative Diagnosis: Abscess of right forearm [L02.413]     Postoperative Diagnosis: Abscess of right forearm [L02.413]     Procedure(s):  RIGHT FOREARM INCISION AND DRAINAGE WITH DEBRIDEMENT OF MUSCLE    Primary Surgeon: Matthew Oneal MD     PA: Jose Maria Buitrago PA-C     Skilled assistance was needed for patient positioning, prepping and draping, instrument holding and passing, retracting, and suturing.    Anesthesia: General     Specimen:   ID Type Source Tests Collected by Time Destination   A : Right forearm wound Tissue Arm, right ANAEROBIC CULTURE, TISSUE AEROBIC CULTURE Matthew Oneal MD 7/9/2025 1514         Estimated Blood Loss: Blood Output: 25 mL (7/9/2025  3:24 PM)       Complications: none     Indications for procedure:   Patient is a 29-year-old male who presented to the emergency department 2 days following a forearm dog bite. Patient had redness and swelling was admitted for IV antibiotic culture. He was noticed to have worsening volar forearm pain with increased purulent discharge. A CT scan was obtained. Risk, benefits, and alternatives to surgical intervention were discussed with the patient but given the worsening clinical picture with increased purulent discharge and imaging concerning for possible abscess discussed benefits and consideration of incision and drainage of volar forearm fluid collection. Patient agreed to proceed with surgical intervention and informed consent was obtained.     Surgical Findings: Forearm purulent discharge    Operative Summary:    Patient was met in the preoperative holding area where correct side, correct site, correct procedure all confirmed. The patient was transported to the operating room placed  supine on a standard operating room table. General anesthesia was administered by the anesthetic team. The right upper extremity was prepped and draped in usual sterile fashion. A timeout was performed in accordance with WHO standard to confirm the correct patient, correct site, correct site, correct procedure were all confirmed and preoperative antibiotics were held given the desire to obtain culture and patient was maintained on IV antibiotic regimen on the floor. The area of concerning volar forearm bite marks were then bluntly dissected with the use of mosquito forcep and there was a return of purulent fluid from the forearm volar muscle proximally. A deep culture was obtained. The laceration was reopened in its entirety to improve evaluation and dissection of the deep tissue. This was further bluntly dissected with appreciation of a small pocket of potential space in the volar forearm musculature. Septations were then broken up and debrided bluntly. There were small areas of nonviable msucle tissue deep to fascia that were debrided sharply with 15 blade scalpel and excised. The incision was then both copiously irrigated with normal saline and a total of 6 L of normal saline was placed into the bite roland area. Following irrigation there was marked improvement in the overall appearance of the surgical zone. Attention was then turned to closure. Bleeding was controlled with use of electrocautery. The incision were then closed loosely in layers with incision extension closed using 3-0 nylon.  sterile dressing was then applied. Patient was awakened from anesthesia with no untoward event appreciated. Patient was transferred to the postanesthesia care unit where cultures will be followed and antibiotics will be resumed.     Implants: * No implants in log *     Drains: None     Condition: Stable       Matthew Oneal MD

## 2025-07-09 NOTE — ANESTHESIA PREPROCEDURE EVALUATION
PRE-OP EVALUATION    Patient Name: Edi Aguirre    Admit Diagnosis: Left against medical advice [Z53.29]  Cellulitis of right upper extremity [L03.113]  Dog bite of right forearm with infection, initial encounter [S51.851A, L08.9, W54.0XXA]    Pre-op Diagnosis: Abscess of right forearm [L02.413]    RIGHT FOREARM IRRIGATION & DEBRIDEMENT    Anesthesia Procedure: RIGHT FOREARM IRRIGATION & DEBRIDEMENT (Right)    Surgeons and Role:     * Matthew Oneal MD - Primary    Pre-op vitals reviewed.  Temp: 97.7 °F (36.5 °C)  Pulse: 65  Resp: 18  BP: 131/82  SpO2: 98 %  Body mass index is 21.32 kg/m².    Current medications reviewed.  Hospital Medications:  Current Medications[1]    Outpatient Medications:   Prescriptions Prior to Admission[2]    Allergies: Patient has no known allergies.      Anesthesia Evaluation    Patient summary reviewed.    Anesthetic Complications  (-) history of anesthetic complications         GI/Hepatic/Renal    Negative GI/hepatic/renal ROS.                             Cardiovascular        Exercise tolerance: good     MET: >4                                           Endo/Other      (+) diabetes   using insulin                         Pulmonary    Negative pulmonary ROS.                       Neuro/Psych      (+) depression                        Smokes tobacco, generous ETOH consumption - sober over on year    Past Surgical History[3]  Social Hx on file[4]  History   Drug Use   • Types: benzodiazepines, Cannabis, PCP     Comment: cocaine,marijuana,abuse of benzodiazepines     Available pre-op labs reviewed.  Lab Results   Component Value Date    WBC 6.9 07/08/2025    RBC 4.40 07/08/2025    HGB 13.4 07/08/2025    HCT 40.4 07/08/2025    MCV 91.8 07/08/2025    MCH 30.5 07/08/2025    MCHC 33.2 07/08/2025    RDW 12.2 07/08/2025    .0 07/08/2025     Lab Results   Component Value Date     07/08/2025    K 4.4 07/08/2025     07/08/2025    CO2 30.0 07/08/2025    BUN 10 07/08/2025     CREATSERUM 0.85 07/08/2025     (H) 07/08/2025    CA 9.7 07/08/2025            Airway      Mallampati: II  Mouth opening: 3 FB  TM distance: 4 - 6 cm  Neck ROM: full Cardiovascular    Cardiovascular exam normal.         Dental    Dentition appears grossly intact         Pulmonary    Pulmonary exam normal.                 Other findings        ASA: 2   Plan: general  NPO status verified and patient meets guidelines.  Patient has not taken beta blockers in last 24 hours.      Comment: GA with LMA/ETT. Risk discussed including sore throat, hoarse voice, dental trauma, nausea/vomiting  Plan/risks discussed with: patient and mother            Present on Admission:  **None**                 [1]  • nicotine (Nicoderm CQ) 14 MG/24HR patch 1 patch  1 patch Transdermal Daily   • atorvastatin (Lipitor) tab 10 mg  10 mg Oral Daily   • DULoxetine (Cymbalta) DR cap 90 mg  90 mg Oral Daily   • [COMPLETED] ketorolac (Toradol) 30 MG/ML injection 30 mg  30 mg Intravenous Once   • ampicillin-sulbactam (Unasyn) 3 g in sodium chloride 0.9% 100mL IVPB-JACOBO  3 g Intravenous q6h   • [COMPLETED] sodium chloride 0.9 % IV bolus 1,000 mL  1,000 mL Intravenous Once   • [COMPLETED] iopamidol 76% (ISOVUE-370) injection for power injector  100 mL Intravenous ONCE PRN   • heparin (Porcine) 5000 UNIT/ML injection 5,000 Units  5,000 Units Subcutaneous Q8H LESLEE   • acetaminophen (Tylenol Extra Strength) tab 500 mg  500 mg Oral Q4H PRN   • polyethylene glycol (PEG 3350) (Miralax) 17 g oral packet 17 g  17 g Oral Daily PRN   • sennosides (Senokot) tab 17.2 mg  17.2 mg Oral Nightly PRN   • bisacodyl (Dulcolax) 10 MG rectal suppository 10 mg  10 mg Rectal Daily PRN   • fleet enema (Fleet) rectal enema 133 mL  1 enema Rectal Once PRN   • ondansetron (Zofran) 4 MG/2ML injection 4 mg  4 mg Intravenous Q6H PRN   • prochlorperazine (Compazine) 10 MG/2ML injection 5 mg  5 mg Intravenous Q8H PRN   • ketorolac (Toradol) 30 MG/ML injection 30 mg  30 mg  Intravenous Q6H PRN   • glucose (Dex4) 15 GM/59ML oral liquid 15 g  15 g Oral Q15 Min PRN    Or   • glucose (Glutose) 40% oral gel 15 g  15 g Oral Q15 Min PRN    Or   • glucose-vitamin C (Dex-4) chewable tab 4 tablet  4 tablet Oral Q15 Min PRN    Or   • dextrose 50% injection 50 mL  50 mL Intravenous Q15 Min PRN    Or   • glucose (Dex4) 15 GM/59ML oral liquid 30 g  30 g Oral Q15 Min PRN    Or   • glucose (Glutose) 40% oral gel 30 g  30 g Oral Q15 Min PRN    Or   • glucose-vitamin C (Dex-4) chewable tab 8 tablet  8 tablet Oral Q15 Min PRN   • nicotine polacrilex (Nicorette) gum 2 mg  2 mg Oral Q1H PRN   • insulin via Insulin Pump   Subcutaneous TID AC and HS   [2]  Medications Prior to Admission   Medication Sig Dispense Refill Last Dose/Taking   • atorvastatin 10 MG Oral Tab Take 1 tablet (10 mg total) by mouth daily.   7/8/2025   • amoxicillin clavulanate 875-125 MG Oral Tab Take 1 tablet by mouth 2 (two) times daily for 7 days. 14 tablet 0 7/8/2025   • cyclobenzaprine 10 MG Oral Tab Take 1 tablet (10 mg total) by mouth 2 (two) times daily as needed for Muscle spasms. 20 tablet 0 Past Month   • DULOXETINE 30 MG Oral Cap DR Particles TAKE THREE CAPSULES DAILY (Patient not taking: Reported on 7/8/2025) 90 capsule 1 Not Taking   • DULoxetine 60 MG Oral Cap DR Particles Take 1 capsule (60 mg total) by mouth daily. (Patient taking differently: Take 90 mg by mouth in the morning.) 90 capsule 1    [3]  Past Surgical History:  Procedure Laterality Date   • Hc implant ear tubes      as a child   • Other surgical history     • Other surgical history     • Tonsillectomy     [4]  Social History  Socioeconomic History   • Marital status: Single   Tobacco Use   • Smoking status: Former     Current packs/day: 1.00     Average packs/day: 1 pack/day for 11.0 years (11.0 ttl pk-yrs)     Types: Cigarettes   • Smokeless tobacco: Never   • Tobacco comments:     smoked since third grade   Vaping Use   • Vaping status: Every Day    Substance and Sexual Activity   • Alcohol use: Yes     Alcohol/week: 42.0 standard drinks of alcohol     Types: 42 Cans of beer per week   • Drug use: Yes     Types: benzodiazepines, Cannabis, PCP     Comment: cocaine,marijuana,abuse of benzodiazepines

## 2025-07-09 NOTE — PLAN OF CARE
Assumed care at 0730  Patient is alert and oriented x4  Room air  Non tele monitored  Non cardiac electrolyte protocol  Heparin for VTE  Q6hour accuchecks  Continent of bowel and bladder, up ad boaz   PRNs available to right FA pain  Dressing to RFA CDI  Pt to have I&D today around 2-3pm for RFA dog bite  IV abx  Pt has CGM and insulin pump to RLQ  Has been NPO since MN  Will continue plan of care.   Problem: Diabetes/Glucose Control  Goal: Glucose maintained within prescribed range  Description: INTERVENTIONS:  - Monitor Blood Glucose as ordered  - Assess for signs and symptoms of hyperglycemia and hypoglycemia  - Administer ordered medications to maintain glucose within target range  - Assess barriers to adequate nutritional intake and initiate nutrition consult as needed  - Instruct patient on self management of diabetes  Outcome: Progressing        Problem: PAIN - ADULT  Goal: Verbalizes/displays adequate comfort level or patient's stated pain goal  Description: INTERVENTIONS:  - Encourage pt to monitor pain and request assistance  - Assess pain using appropriate pain scale  - Administer analgesics based on type and severity of pain and evaluate response  - Implement non-pharmacological measures as appropriate and evaluate response  - Consider cultural and social influences on pain and pain management  - Manage/alleviate anxiety  - Utilize distraction and/or relaxation techniques  - Monitor for opioid side effects  - Notify MD/LIP if interventions unsuccessful or patient reports new pain  - Anticipate increased pain with activity and pre-medicate as appropriate  Outcome: Progressing     Problem: SAFETY ADULT - FALL  Goal: Free from fall injury  Description: INTERVENTIONS:  - Assess pt frequently for physical needs  - Identify cognitive and physical deficits and behaviors that affect risk of falls.  - [place] fall precautions as indicated by assessment.  - Educate pt/family on patient safety including physical  limitations  - Instruct pt to call for assistance with activity based on assessment  - Modify environment to reduce risk of injury  - Provide assistive devices as appropriate  - Consider OT/PT consult to assist with strengthening/mobility  - Encourage toileting schedule  Outcome: Progressing     Problem: DISCHARGE PLANNING  Goal: Discharge to home or other facility with appropriate resources  Description: INTERVENTIONS:  - Identify barriers to discharge w/pt and caregiver  - Include patient/family/discharge partner in discharge planning  - Arrange for needed discharge resources and transportation as appropriate  - Identify discharge learning needs (meds, wound care, etc)  - Arrange for interpreters to assist at discharge as needed  - Consider post-discharge preferences of patient/family/discharge partner  - Complete POLST form as appropriate  - Assess patient's ability to be responsible for managing their own health  - Refer to Case Management Department for coordinating discharge planning if the patient needs post-hospital services based on physician/LIP order or complex needs related to functional status, cognitive ability or social support system  Outcome: Progressing

## 2025-07-09 NOTE — ED QUICK NOTES
Orders for admission, patient is aware of plan and ready to go upstairs. Any questions, please call ED RN Saul at extension [phone].     Patient Covid vaccination status: Unvaccinated     COVID Test Ordered in ED: None    COVID Suspicion at Admission: N/A    Running Infusions: Medication Infusions[1] None    Mental Status/LOC at time of transport: A&O*4/4    Other pertinent information:   CIWA score: N/A   NIH score:  N/A             [1]

## 2025-07-09 NOTE — ANESTHESIA PROCEDURE NOTES
Airway  Date/Time: 7/9/2025 2:57 PM  Reason: elective    Airway not difficult    General Information and Staff   Patient location during procedure: OR  Anesthesiologist: Jeff Smith MD  Performed: anesthesiologist   Performed by: Jeff Smith MD  Authorized by: Jeff Smith MD        Indications and Patient Condition  Indications for airway management: anesthesia  Sedation level: deep      Preoxygenated: yesPatient position: sniffing    Mask difficulty assessment: 1 - vent by mask    Final Airway Details    Final airway type: supraglottic airway      Successful airway: classic  Size: 3     Number of attempts at approach: 1

## 2025-07-10 VITALS
TEMPERATURE: 97 F | HEIGHT: 73 IN | SYSTOLIC BLOOD PRESSURE: 128 MMHG | WEIGHT: 161.63 LBS | HEART RATE: 87 BPM | BODY MASS INDEX: 21.42 KG/M2 | OXYGEN SATURATION: 99 % | RESPIRATION RATE: 18 BRPM | DIASTOLIC BLOOD PRESSURE: 71 MMHG

## 2025-07-10 LAB
GLUCOSE BLD-MCNC: 286 MG/DL (ref 70–99)
GLUCOSE BLD-MCNC: 302 MG/DL (ref 70–99)

## 2025-07-10 PROCEDURE — 82962 GLUCOSE BLOOD TEST: CPT

## 2025-07-10 RX ORDER — HYDROCODONE BITARTRATE AND ACETAMINOPHEN 5; 325 MG/1; MG/1
1 TABLET ORAL NIGHTLY
Qty: 7 TABLET | Refills: 0 | Status: SHIPPED | OUTPATIENT
Start: 2025-07-10

## 2025-07-10 RX ORDER — ACETAMINOPHEN 500 MG
TABLET ORAL
Status: SHIPPED | COMMUNITY
Start: 2025-07-10

## 2025-07-10 RX ORDER — ACETAMINOPHEN 500 MG
500 TABLET ORAL EVERY 4 HOURS PRN
Status: DISCONTINUED | OUTPATIENT
Start: 2025-07-10 | End: 2025-07-10

## 2025-07-10 RX ORDER — IBUPROFEN 600 MG/1
600 TABLET, FILM COATED ORAL EVERY 8 HOURS
Status: SHIPPED | COMMUNITY
Start: 2025-07-10

## 2025-07-10 RX ORDER — DULOXETIN HYDROCHLORIDE 30 MG/1
90 CAPSULE, DELAYED RELEASE ORAL DAILY
Status: SHIPPED | COMMUNITY
Start: 2025-07-10

## 2025-07-10 NOTE — DISCHARGE INSTRUCTIONS
Wound care  -Maintain current dressing and keep it clean, dry until this Saturday. You will need to cover it while showering.   -Change you dressing this Saturday and replace with sterile gauze and wrap it with ace bandage, gauze bandage, etc.   -Keep this dressing clean and dry until we see you next week. You may change as needed.       Managing pain per Iftikhar JACOBSON  -Ice during the day, 20 minutes on then 20 minutes off

## 2025-07-10 NOTE — OCCUPATIONAL THERAPY NOTE
OT orders received, chart reviewed and discussed with RN.  Patient has been discharged from hospital at this time.  Orders completed.

## 2025-07-10 NOTE — PLAN OF CARE
Assumed patient care at 1930  Pt oriented x 4   Not on tele monitor   Vitals stable at room air   S/p RFA I&D   +ve CMS, dressing c/d/I   Prn toradol for pain   Antibiotics scheduled   Ambulatory ad boaz   Accuchecks QID   All needs met at this time   Ongoing plan of care     Problem: Diabetes/Glucose Control  Goal: Glucose maintained within prescribed range  Description: INTERVENTIONS:  - Monitor Blood Glucose as ordered  - Assess for signs and symptoms of hyperglycemia and hypoglycemia  - Administer ordered medications to maintain glucose within target range  - Assess barriers to adequate nutritional intake and initiate nutrition consult as needed  - Instruct patient on self management of diabetes  Outcome: Progressing       Problem: PAIN - ADULT  Goal: Verbalizes/displays adequate comfort level or patient's stated pain goal  Description: INTERVENTIONS:  - Encourage pt to monitor pain and request assistance  - Assess pain using appropriate pain scale  - Administer analgesics based on type and severity of pain and evaluate response  - Implement non-pharmacological measures as appropriate and evaluate response  - Consider cultural and social influences on pain and pain management  - Manage/alleviate anxiety  - Utilize distraction and/or relaxation techniques  - Monitor for opioid side effects  - Notify MD/LIP if interventions unsuccessful or patient reports new pain  - Anticipate increased pain with activity and pre-medicate as appropriate  Outcome: Progressing     Problem: SAFETY ADULT - FALL  Goal: Free from fall injury  Description: INTERVENTIONS:  - Assess pt frequently for physical needs  - Identify cognitive and physical deficits and behaviors that affect risk of falls.  - [place] fall precautions as indicated by assessment.  - Educate pt/family on patient safety including physical limitations  - Instruct pt to call for assistance with activity based on assessment  - Modify environment to reduce risk of  injury  - Provide assistive devices as appropriate  - Consider OT/PT consult to assist with strengthening/mobility  - Encourage toileting schedule  Outcome: Progressing     Problem: DISCHARGE PLANNING  Goal: Discharge to home or other facility with appropriate resources  Description: INTERVENTIONS:  - Identify barriers to discharge w/pt and caregiver  - Include patient/family/discharge partner in discharge planning  - Arrange for needed discharge resources and transportation as appropriate  - Identify discharge learning needs (meds, wound care, etc)  - Arrange for interpreters to assist at discharge as needed  - Consider post-discharge preferences of patient/family/discharge partner  - Complete POLST form as appropriate  - Assess patient's ability to be responsible for managing their own health  - Refer to Case Management Department for coordinating discharge planning if the patient needs post-hospital services based on physician/LIP order or complex needs related to functional status, cognitive ability or social support system  Outcome: Progressing   Problem: PAIN - ADULT  Goal: Verbalizes/displays adequate comfort level or patient's stated pain goal  Description: INTERVENTIONS:  - Encourage pt to monitor pain and request assistance  - Assess pain using appropriate pain scale  - Administer analgesics based on type and severity of pain and evaluate response  - Implement non-pharmacological measures as appropriate and evaluate response  - Consider cultural and social influences on pain and pain management  - Manage/alleviate anxiety  - Utilize distraction and/or relaxation techniques  - Monitor for opioid side effects  - Notify MD/LIP if interventions unsuccessful or patient reports new pain  - Anticipate increased pain with activity and pre-medicate as appropriate  Outcome: Progressing

## 2025-07-10 NOTE — PLAN OF CARE
NURSING DISCHARGE NOTE    Discharged Home via Wheelchair.  Accompanied by Kari BURDICK  Belongings Taken by patient/family.      Orthopedics and Attending signed off. Pt given return to work letter per pt request. Pt verbalized understanding of the discharge instructions. Pt left with all their belongings on a wheelchair. Pt's mom at the bedside to provide pt a ride home per pt report.

## 2025-07-10 NOTE — PROGRESS NOTES
[facility]  Progress Note    Edi Aguirre Patient Status:  Inpatient    12/10/1995 MRN [id number]   [facility] 3NE-A Attending Svetlana Treviño MD   Hosp Day # 2 PCP Slade Yi MD       Subjective:  POD #1 from a right forearm incision and drainage with debridement of muscle for treatment of a forearm abscess status post dog bite.    No overnight events.  Patient was seen resting comfortably in bed.  He is receiving IV antibiotics denies any adverse effects.  Does have some mild soreness to the forearm but overall his pain has been very well-controlled.  He is complaining of numbness to the base of the thumb and dorsal aspect of the first webspace that was present prior to surgery. Denies fever, chills, chest pain, shortness of breath, calf pain        Objective:  Vital signs in last 24 hours:  Patient Vitals for the past 24 hrs:   BP Temp Temp src Pulse Resp SpO2   07/10/25 0500 124/82 97.8 °F (36.6 °C) Oral 83 18 98 %   25 1900 145/75 98 °F (36.7 °C) Oral -- 18 95 %   25 1750 -- -- -- 82 22 --   25 1745 -- -- -- 81 16 93 %   25 1740 -- -- -- 71 16 96 %   25 1730 -- -- -- 73 13 96 %   25 1720 -- -- -- 78 11 97 %   25 1710 -- -- -- 68 11 98 %   25 1705 -- -- -- 68 17 98 %   25 1700 -- -- -- 62 14 96 %   25 1655 128/74 -- -- 66 10 95 %   25 1650 -- -- -- 69 12 97 %   25 1645 -- -- -- 79 12 98 %   25 1640 -- -- -- 64 (!) 9 95 %   25 1635 131/77 -- -- 73 18 97 %   25 1630 -- -- -- 76 20 93 %   25 1625 -- -- -- 83 16 98 %   25 1620 -- -- -- 80 16 100 %   25 1615 127/78 -- -- 77 11 100 %   25 1610 119/71 -- -- 84 16 100 %   25 1605 125/78 -- -- 85 13 100 %   25 1600 127/84 99 °F (37.2 °C) Temporal 87 16 100 %   25 1130 131/82 97.7 °F (36.5 °C) Oral 65 18 98 %         General: A&Ox3, NAD  Neuro: Motor grossly intact.  Decree sensation in the radial  nerve distribution at the base of the thumb of the right hand  MSK:   On examination of the right upper extremity, dressings are clean, dry, intact without any strikethrough drainage.  Exposed skin does not demonstrate any erythema or increased warmth.  Full range of motion of the elbow, wrist, hand.  Extremity is neurovascularly intact.          Data Review  CBC:   Recent Labs   Lab 07/08/25  1355   RBC 4.40   HGB 13.4   HCT 40.4   MCV 91.8   MCH 30.5   MCHC 33.2   RDW 12.2   NEPRELIM 4.66   WBC 6.9   .0       Recent Labs   Lab 07/08/25  1355   *   BUN 10   CREATSERUM 0.85   EGFRCR 121   CA 9.7   ALB 4.8      K 4.4      CO2 30.0   ALKPHO 94   AST 22   ALT 25   BILT 0.5   TP 7.2         Microbiology data:  Hospital Encounter on 07/08/25   1. Tissue Aerobic Culture     Status: None (Preliminary result)    Collection Time: 07/09/25  3:14 PM    Specimen: Arm, right; Tissue   Result Value Ref Range    Tissue Smear No WBCs seen N/A    Tissue Smear No organisms seen N/A   2. Blood Culture     Status: None (Preliminary result)    Collection Time: 07/08/25  1:56 PM    Specimen: Blood,peripheral   Result Value Ref Range    Blood Culture Result No Growth 1 Day N/A        Assessment/Plan:    This is a 29-year-old male who is postop day 1 from a right forearm incision and drainage with debridement of muscle secondary to dog bite.  Overall the patient is doing very well and his symptoms remain nonconcerning.  Remains hemodynamically stable and is tolerating antibiotic regiment.    1) Continue present management  2) thoroughly discussed dressing plan.  The patient will maintain the current dressing and keep it clean, dry, and intact until postop day 3 (Saturday).  At that time he will do a dressing change with sterile gauze with Ace bandage or Webril.  He will maintain the dressing and clean as needed.  Did advise that he keep it clean and dry and covered during showers. He will maintain that dressing  until he is seen next week.   2) PT/OT - 2-3 lb weight restrictions for 2 weeks.   3) continue pain control  4) Continue IV Abx and transition to oral per hospitalist discretion.   5) Continue medical care per hospitalist recommendations.   6) DVT prophylaxis per hospitalist  7) Discharge plan: Patient is clear for discharge from orthopedics standpoint once cleared by medical team and has outpatient Abx plan established. Scheduled him for follow-up 1 week post op.         Jose Maria Buitrago PA-C  [facility]  Orthopedic Surgery  7/10/2025  8:59 AM

## 2025-07-10 NOTE — PAYOR COMM NOTE
--------------  ADMISSION REVIEW     Payor: [employer]  Subscriber #:  [id number]  Authorization Number: [id number]    Admit date: 7/8/25  Admit time:  8:22 PM       Patient Seen in: [facility] In [place]      Stated Complaint: Wound check    29-year-old male presents to the emergency department with complaint of arm redness.  Patient was seen here on Sunday for a dog bite to the right forearm.  Sutures were placed to the arm to close the wound.  Patient was also sent home on antibiotics.  Patient states he has been taking antibiotics as prescribed.  However, his last night he noticed that the arm felt more swollen and painful.  Today he noticed that there is more redness to the upper forearm and under the bite.  Patient does have a history of depression, type 1 diabetes, and polysubstance dependence.  He has not taken anything for pain today.  He denies any fever, chills, or exudates.        Past Medical History:    Depression    Polysubstance dependence (HCC)    Type 1 diabetes mellitus (HCC)             Past Surgical History:   Procedure Laterality Date    Hc implant ear tubes         as a child    Other surgical history        Other surgical history        Tonsillectomy          Alcohol use: Yes        Alcohol/week: 42.0 standard drinks of alcohol       Types: 42 Cans of beer per week    Drug use: Yes       Types: benzodiazepines, Cannabis, PCP       Comment: cocaine,marijuana,abuse of benzodiazepines           ED Triage Vitals [07/08/25 1322]   /75   Pulse 86   Resp 16   Temp 98.9 °F (37.2 °C)   Temp src Temporal   SpO2 98 %   O2 Device None (Room air)       Current Vitals:   Vital Signs  BP: 131/73  Pulse: 79  Resp: 16  Temp: 98.9 °F (37.2 °C)  Temp src: Temporal       Physical Exam  Vitals and nursing note reviewed.   Constitutional:       General: He is not in acute distress.     Appearance: Normal appearance. He is normal weight. He is not ill-appearing.   HENT:       Head: Normocephalic and atraumatic.   Eyes:      Conjunctiva/sclera: Conjunctivae normal.      Pupils: Pupils are equal, round, and reactive to light.   Cardiovascular:      Rate and Rhythm: Normal rate and regular rhythm.      Pulses: Normal pulses.      Heart sounds: Normal heart sounds.   Pulmonary:      Effort: Pulmonary effort is normal. No respiratory distress.      Breath sounds: Normal breath sounds.   Musculoskeletal:         General: Normal range of motion.   Skin:     General: Skin is warm and dry.      Capillary Refill: Capillary refill takes less than 2 seconds.      Findings: Erythema present.      Comments: Right forearm is significantly edematous and erythematous. Erythema does not extend past the wrist or elbow.  However, it has moved more medially on the forearm.  Tender to palpation.  Sutures are intact.  No exudates.  Sensation deficits with some numbness to distal radial forearm.  Cap refill <2 sec and radial pulse 2+/    Neurological:      General: No focal deficit present.      Mental Status: He is alert and oriented to person, place, and time.   Psychiatric:         Mood and Affect: Mood normal.         Behavior: Behavior normal.     Labs Reviewed   COMP METABOLIC PANEL (14) - Abnormal; Notable for the following components:       Result Value      Glucose 165 (*)       All other components within normal limits   LACTIC ACID, PLASMA - Abnormal; Notable for the following components:     Lactic Acid 2.3 (*)       All other components within normal limits   CBC WITH DIFFERENTIAL WITH PLATELET   LACTIC ACID REFLEX POST POSTIVE   BLOOD CULTURE   BLOOD CULTURE      Value: CBC With Differential With Platelet  Comment: No elevated WBC or leukocytosis.  H&H is stable at 13.4 and 40.4.  ------------------------------------------------------------  Time: 07/08 1539  Value: Comp Metabolic Panel (14)(!)  Comment: Glucose of 165 and a known type I diabetic.  All other labs are unremarkable and  essentially normal.  ------------------------------------------------------------  Time: 07/08 1539  Value: Lactic Acid, Plasma(!)  Comment: Lactic acid is elevated at 2.3.  IV fluids were ordered.  ------------------------------------------------------------  Time: 07/08 1544  Comment: Spoke with Dr. Oneal on call for orthopedics via [employer].  He will consult and see patient tomorrow morning.  Requests NPO after midnight in case needs wash out.          Medical Decision Making  30 yo male with infected dog bite to the right forearm.  CBC, CMP, Lactic acid, blood cultures x2, IV fluids, Toradol and Unasyn ordered.      CT scan of right upper extremity to r/o abscess added by Dr. DAVID Kamara.      See ED course for labs.  Dr. Kamara to review CT scan and [facility]  Spoke with Dr. Oneal via [employer] regarding orthopedic consult.  He will see patient tomorrow morning and requested pictures and NPO after midnight in case patient requires surgery.      Disposition and Plan      Clinical Impression:  1. Dog bite of right forearm with infection, initial encounter          History and Physical   History of Present Illness: Patient is a 29 year old male with PMH sig for DM on insulin pump who presented with dog bite on R forearm. Was seen in ED on 7/6, wound irrigated/loosely closed and augmentin prescribed, received tetanus. He returned due to worsening swelling, R forearm redness. No fevers. No sob/cp/dizziness     Noted to have erythema of R forearm extending up to R arm. Ortho to see, CT forearm done. Given IV abx, IVF, toradol. Lactic 2.3--> 1.1     OBJECTIVE:  /73   Pulse 79   Temp 98.9 °F (37.2 °C) (Temporal)   Resp 16   Ht 6' 1\" (1.854 m)   Wt 155 lb (70.3 kg)   SpO2 98%   BMI 20.45 kg/m²      Gen- NAD, appears stated age  HEENT- NCAT, anicteric sclera, MMM, OP clear  Lymph- no cervical LAD  CV- RRR no murmurs. No JOSE GUADALUPE  Lungs- CTAB, good respiratory effort  Abd- soft, ntnd,  no organomegaly, BS+  Derm/msk- R forearm with swelling, wound bandaged. Able to move R fingers. Good radial pulse  Neuro- A&OX3, no focal deficits      Lab 07/08/25  1355   WBC 6.9   HGB 13.4   MCV 91.8   .0         K 4.4      CO2 30.0   BUN 10   CREATSERUM 0.85   *   CA 9.7      ALT 25   AST 22   ALB 4.8       ASSESSMENT / PLAN:   28 yo man with R forearm dog bite, concern for purulent infection despite oral abx.     R forearm infection/laceration drainage in setting of dog bite on 7/6  - ortho consulted  - planning or for I&D today  - cont IV unasyn  - blood cultures pending  - lactic acid normalized        DM- on insulin pump. Hgba1c 8.6     H/o etoh abuse- has been abstinent from etoh     Tob use- vapes regularly, asks for nicotine gum. As pt is npo, nicotine patch is recommended     Anxiety/depression- ordered home med     Subcutaneous heparin    7/9:     ORTHO:    History of Present Illness:  Edi Aguirre is a a(n) 29 year old male who returned for evaluation following dog bite to right forearm on Sunday. Seen at ED and wound irrigated with closure and oral Abx. Worsening pain and discharge, returned to ED and admitted for IV abx. Improved redness overnight, but continued purulent drainage from laceration.      Current Facility-Administered Medications:     ampicillin-sulbactam (Unasyn) 3 g in sodium chloride 0.9% 100mL IVPB-JACOBO, 3 g, Intravenous, q6h    heparin (Porcine) 5000 UNIT/ML injection 5,000 Units, 5,000 Units, Subcutaneous, Q8H LESLEE    insulin via Insulin Pump, , Subcutaneous, TID AC and HS      Physical Exam:     General: Alert, orientated x3.  Cooperative.  No apparent distress.  Vital Signs:  Blood pressure 126/71, pulse 71, temperature 97.7 °F (36.5 °C), temperature source Oral, resp. rate 18, height 6' 1\" (1.854 m), weight 161 lb 9.6 oz (73.3 kg), SpO2 99%.  RUE:  Forearm laceration approximated with nylon suture. No dehiscence. Small area of palpable fluctuance  with expressible purulence. NVI distally.     Laboratory Data:  Lactate 2.3 on arrival, improved with IV fluids     Impression and Plan:  [Problem List]    [Problem List]  Patient Active Problem List      Diagnosis    Generalized anxiety disorder    Major depressive disorder, recurrent episode, moderate (HCC)    Type 1 diabetes mellitus (HCC)    History of drug dependence (HCC)    Tobacco user    PTSD (post-traumatic stress disorder)    Dog bite of right forearm with infection, initial encounter    Cellulitis of right upper extremity    Left against medical advice        30 yo M with R forearm laceration drainage following dog bite on Sunday 7/6 that failed oral abx     - Discussed with patient that given area of fluctuance and persistent purulent drainage, most appropriate to proceed with forearm I&D today  - NPO for OR  - Continue IV abx  - Further recommendations pending intra-operative findings    Operative Note             Preoperative Diagnosis: Abscess of right forearm [L02.413]      Postoperative Diagnosis: Abscess of right forearm [L02.413]      Procedure(s):  RIGHT FOREARM INCISION AND DRAINAGE WITH DEBRIDEMENT OF MUSCLE     Anesthesia: General       Indications for procedure:   Patient is a 29-year-old male who presented to the emergency department 2 days following a forearm dog bite. Patient had redness and swelling was admitted for IV antibiotic culture. He was noticed to have worsening volar forearm pain with increased purulent discharge. A CT scan was obtained. Risk, benefits, and alternatives to surgical intervention were discussed with the patient but given the worsening clinical picture with increased purulent discharge and imaging concerning for possible abscess discussed benefits and consideration of incision and drainage of volar forearm fluid collection. Patient agreed to proceed with surgical intervention and informed consent was obtained.      Surgical Findings: Forearm purulent  discharge      MEDICATIONS ADMINISTERED IN LAST 1 DAY:  acetaminophen (Tylenol Extra Strength) tab 500 mg       Date Action Dose Route User    7/10/2025 1038 Given 500 mg Oral oRb Cazares RN          ampicillin-sulbactam (Unasyn) 3 g in sodium chloride 0.9% 100mL IVPB-JACOBO       Date Action Dose Route User    7/10/2025 0910 New Bag 3 g Intravenous Rob Cazares RN    7/10/2025 0458 New Bag 3 g Intravenous Barb Bedolla RN    7/9/2025 2256 New Bag 3 g Intravenous TufuBarb dockery RN    7/9/2025 1829 New Bag 3 g Intravenous Dee Fermin RN     ceFAZolin (Ancef) 2g in 10mL IV syringe premix       Date Action Dose Route User    7/9/2025 1520 Given 2 g Intravenous Jeff Smith MD     dextrose 50% injection 50 mL       Date Action Dose Route User    7/9/2025 1450 Given 25 mL Intravenous Jeff Smith MD     heparin (Porcine) 5000 UNIT/ML injection 5,000 Units       Date Action Dose Route User    7/10/2025 0508 Given 5,000 Units Subcutaneous (Left Lower Abdomen) Barb Bedolla RN    7/9/2025 2118 Given 5,000 Units Subcutaneous (Right Lower Abdomen) Barb Bedolla RN          HYDROmorphone (Dilaudid) 1 MG/ML injection 0.4 mg       Date Action Dose Route User    7/9/2025 1630 Given 0.4 mg Intravenous Sakshi Rogers RN          HYDROmorphone (Dilaudid) 1 MG/ML injection 0.6 mg       Date Action Dose Route User    7/9/2025 1640 Given 0.6 mg Intravenous Sakshi Rogers RN    7/9/2025 1620 Given 0.6 mg Intravenous Katherine James RN          HYDROmorphone (Dilaudid) 1 MG/ML injection       Date Action Dose Route User    7/9/2025 1620 Given 0.6 mg Intravenous Katherine James RN          HYDROmorphone (Dilaudid) 1 MG/ML injection       Date Action Dose Route User    7/9/2025 1640 Given 0.6 mg Intravenous Sakshi Rogers RN          insulin via Insulin Pump       Date Action Dose Route User    7/10/2025 0700 Self Administered via Pump 4.35 Units Subcutaneous (Right Lower Abdomen) Barb Bedolla RN     7/9/2025 2100 Self Administered via Pump 10 Units Subcutaneous (Right Lower Abdomen) Barb Bedolla, BALDOMERO          ketorolac (Toradol) 30 MG/ML injection 30 mg       Date Action Dose Route User    7/10/2025 0506 Given 30 mg Intravenous Barb Bedolla RN    7/9/2025 1852 Given 30 mg Intravenous Dee Fermin RN          lactated ringers infusion       Date Action Dose Route User    7/9/2025 1600 Rate/Dose Change (none) Intravenous Katherine James RN          lactated ringers infusion       Date Action Dose Route User    7/9/2025 1450 New Bag (none) Intravenous Jeff Smith MD     ondansetron (Zofran) 4 MG/2ML injection 4 mg       Date Action Dose Route User    7/9/2025 1515 Given 4 mg Intravenous Jeff Smith MD       Vitals (last day)       Date/Time Temp Pulse Resp BP SpO2 Weight O2 Device O2 Flow Rate (L/min) [facility]    07/10/25 1130 96.9 °F (36.1 °C) 87 18 128/71 99 % -- None (Room air) --     07/10/25 0500 97.8 °F (36.6 °C) 83 18 124/82 98 % -- None (Room air) -- LT    07/09/25 1900 98 °F (36.7 °C) -- 18 145/75 95 % -- None (Room air) -- SB    07/09/25 1750 -- 82 22 -- -- -- -- -- DS    07/09/25 1745 -- 81 16 -- 93 % -- -- -- DS    07/09/25 1740 -- 71 16 -- 96 % -- -- -- DS    07/09/25 1730 -- 73 13 -- 96 % -- -- -- DS    07/09/25 1720 -- 78 11 -- 97 % -- -- -- DS    07/09/25 1710 -- 68 11 -- 98 % -- -- -- DS    07/09/25 1705 -- 68 17 -- 98 % -- -- -- DS    07/09/25 1700 -- 62 14 -- 96 % -- -- -- DS    07/09/25 1655 -- 66 10 128/74 95 % -- -- -- DS    07/09/25 1650 -- 69 12 -- 97 % -- -- -- DS    07/09/25 1645 -- 79 12 -- 98 % -- -- -- DS    07/09/25 1640 -- 64 9 -- 95 % -- -- -- DS    07/09/25 1635 -- 73 18 131/77 97 % -- -- --     07/09/25 1630 -- 76 20 -- 93 % -- -- -- DS    07/09/25 1625 -- 83 16 -- 98 % -- -- -- DS    07/09/25 1620 -- 80 16 -- 100 % -- -- -- MB    07/09/25 1615 -- 77 11 127/78 100 % -- -- -- MB    07/09/25 1610 -- 84 16 119/71 100 % -- -- -- MB    07/09/25 1605 -- 85 13  125/78 100 % -- -- -- MB    07/09/25 1600 99 °F (37.2 °C) 87 16 127/84 100 % -- None (Room air) -- MB    07/09/25 1130 97.7 °F (36.5 °C) 65 18 131/82 98 % -- None (Room air) 0 L/min ND    07/09/25 0500 97.7 °F (36.5 °C) 71 18 126/71 99 % -- None (Room air) -- LT    07/09/25 0449 97.8 °F (36.6 °C) 64 18 123/76 -- -- None (Room air) -- BC

## 2025-07-10 NOTE — PLAN OF CARE
Assumed care of pt after shift report. Received pt A&Ox4, follows commands. Non-tele. RA. VSS. Regular diet. Contx 2. PRN tylenol given for Rt arm pain, pain resolved. Bed low and locked in position, call light within reach. Plan for discharge today.      Problem: Diabetes/Glucose Control  Goal: Glucose maintained within prescribed range  Description: INTERVENTIONS:  - Monitor Blood Glucose as ordered  - Assess for signs and symptoms of hyperglycemia and hypoglycemia  - Administer ordered medications to maintain glucose within target range  - Assess barriers to adequate nutritional intake and initiate nutrition consult as needed  - Instruct patient on self management of diabetes  Outcome: Completed     Problem: Patient/Family Goals  Goal: Patient/Family Long Term Goal  Description: Patient's Long Term Goal: free of re-hospitalization     Interventions:  - follow-up care, outpatient with orthopedics and family medicine   - See additional Care Plan goals for specific interventions  Outcome: Completed  Goal: Patient/Family Short Term Goal  Description: Patient's Short Term Goal: free of pain and Rt forearm infection, d'c to home     Interventions:   - Pain management     - See additional Care Plan goals for specific interventions  Outcome: Completed     Problem: PAIN - ADULT  Goal: Verbalizes/displays adequate comfort level or patient's stated pain goal  Description: INTERVENTIONS:  - Encourage pt to monitor pain and request assistance  - Assess pain using appropriate pain scale  - Administer analgesics based on type and severity of pain and evaluate response  - Implement non-pharmacological measures as appropriate and evaluate response  - Consider cultural and social influences on pain and pain management  - Manage/alleviate anxiety  - Utilize distraction and/or relaxation techniques  - Monitor for opioid side effects  - Notify MD/LIP if interventions unsuccessful or patient reports new pain  - Anticipate increased  pain with activity and pre-medicate as appropriate  Outcome: Completed     Problem: SAFETY ADULT - FALL  Goal: Free from fall injury  Description: INTERVENTIONS:  - Assess pt frequently for physical needs  - Identify cognitive and physical deficits and behaviors that affect risk of falls.  - [place] fall precautions as indicated by assessment.  - Educate pt/family on patient safety including physical limitations  - Instruct pt to call for assistance with activity based on assessment  - Modify environment to reduce risk of injury  - Provide assistive devices as appropriate  - Consider OT/PT consult to assist with strengthening/mobility  - Encourage toileting schedule  Outcome: Completed     Problem: DISCHARGE PLANNING  Goal: Discharge to home or other facility with appropriate resources  Description: INTERVENTIONS:  - Identify barriers to discharge w/pt and caregiver  - Include patient/family/discharge partner in discharge planning  - Arrange for needed discharge resources and transportation as appropriate  - Identify discharge learning needs (meds, wound care, etc)  - Arrange for interpreters to assist at discharge as needed  - Consider post-discharge preferences of patient/family/discharge partner  - Complete POLST form as appropriate  - Assess patient's ability to be responsible for managing their own health  - Refer to Case Management Department for coordinating discharge planning if the patient needs post-hospital services based on physician/LIP order or complex needs related to functional status, cognitive ability or social support system  Outcome: Completed

## 2025-07-11 ENCOUNTER — PATIENT OUTREACH (OUTPATIENT)
Dept: CASE MANAGEMENT | Age: 30
End: 2025-07-11

## 2025-07-11 NOTE — PAYOR COMM NOTE
--------------  DISCHARGE REVIEW    Payor: [employer]  Subscriber #:  [id number]  Authorization Number: [id number]    Admit date: 7/8/25  Admit time:   8:22 PM  Discharge Date: 7/10/2025  1:20 PM     Admitting Physician: Svetlana Treviño MD  Attending Physician:  No att. providers found  Primary Care Physician: Slade Yi MD       Discharge Summary Notes    No notes of this type exist for this encounter.         REVIEWER COMMENTS    HOME

## 2025-07-11 NOTE — PROGRESS NOTES
Hospital follow up.    Last A1C Value: 8.6% Date: [7/8/2025]    Slade Yi MD  Family Medicine  [address]  [address]  [place] [address]  [phone]    Attempt #1:  Left message on voicemail for patient to call transitions specialist back to schedule follow up appointments. Provided Transitions specialist scheduling phone number [phone].

## 2025-07-11 NOTE — PROGRESS NOTES
.[facility] note    PCP: Slade Yi MD    Chief Complaint:  F/u dog bite abscess    SUBJECTIVE:  Pt feeling okay. Pain manageable. No sob/dizziness  Attributes elevated glc overnight due to feasting on food after prolonged npo status     OBJECTIVE:       Intake/Output:  No intake or output data in the 24 hours ending 07/11/25 1655    Last 3 Weights   07/08/25 2228 161 lb 9.6 oz (73.3 kg)   07/08/25 1322 155 lb (70.3 kg)   07/06/25 1238 155 lb (70.3 kg)   03/08/22 1524 153 lb 6.4 oz (69.6 kg)       Exam  Gen: No acute distress  HEENT: anicteric sclera, MMM  Pulm: Lungs clear, normal respiratory effort  CV: Heart with regular rate and rhythm, no peripheral edema  Abd: Abdomen soft, nontender, nondistended, no organomegaly, bowel sounds present  MSK: R forearm bandaged  Neuro: A&OX3, no focal deficits    Data Review:         Labs:     Recent Labs   Lab 07/08/25  1355   WBC 6.9   HGB 13.4   MCV 91.8   .0   NE 4.66   LYMABS 1.46       Recent Labs   Lab 07/08/25  1355      K 4.4      CO2 30.0   BUN 10   CREATSERUM 0.85   CA 9.7   *       Recent Labs   Lab 07/08/25  1355   ALT 25   AST 22   ALB 4.8       No results for input(s): \"TROP\", \"CK\", \"PBNP\", \"PCT\" in the last 168 hours.    No results for input(s): \"CRP\", \"BRIANDA\", \"LDH\", \"DDIMER\" in the last 168 hours.    Recent Labs   Lab 07/09/25  1515 07/09/25  1600 07/09/25  2207 07/10/25  0502 07/10/25  1124   PGLU 134* 99 552* 286* 302*       Meds:   Scheduled Medication:Scheduled Medications[1]  Continuous Infusing Medication:Medication Infusions[2]  PRN Medication:PRN Medications[3]       Microbiology:    Hospital Encounter on 07/08/25   1. Tissue Aerobic Culture     Status: Abnormal    Collection Time: 07/09/25  3:14 PM    Specimen: Arm, right; Tissue   Result Value Ref Range    Tissue Culture Result 1+ growth Pasteurella canis (A) N/A    Tissue Smear No WBCs seen N/A    Tissue Smear No organisms seen N/A   2. Anaerobic Culture      Status: None (Preliminary result)    Collection Time: 07/09/25  3:14 PM    Specimen: Arm, right; Tissue   Result Value Ref Range    Anaerobic Culture Pending N/A   3. Blood Culture     Status: None (Preliminary result)    Collection Time: 07/08/25  1:56 PM    Specimen: Blood,peripheral   Result Value Ref Range    Blood Culture Result No Growth 2 Days N/A       No results found for: \"COVID19\"     Assessment/Plan:   30 yo man with R forearm dog bite, concern for purulent infection despite oral abx.     R forearm infection/laceration drainage in setting of dog bite on 7/6  - ortho consulted  - s/p I&D 7/9  - iv unasyn--> augmentin, will prolong course given muscle involvement on I&D seen  - blood cultures ntd, tissue cultures ntd on dc  - discussed pain med regimen at length, would prefer to avoid heavy narcotics. Tylenol es/ibuprofen alternating. [place] at night prn        DM- on insulin pump. Hgba1c 8.6     H/o etoh abuse- has been abstinent from etoh     Tob use- vapes regularly, asks for nicotine gum. As pt is npo, nicotine patch is recommended     Anxiety/depression- ordered home med     Subcutaneous heparin    Okay for dc. Discussed with orthopedics team and nursing at length.      Svetlana Treviño MD  [facility]  Pager: [phone]         [1] [2] [3]

## 2025-07-14 NOTE — PROGRESS NOTES
Hospital follow up.    Last A1C Value: 8.6% Date: [7/8/2025]    Slade Yi MD  Family Medicine  [address]  [address]  [place] [address] [phone]  Patient does not wish to follow up.    Confirmed with the patient.    Closing encounter.

## 2025-07-16 NOTE — PAYOR COMM NOTE
--------------  DISCHARGE REVIEW    Payor: [email or web address]  Subscriber #:  [id number]  Authorization Number: [id number]    Admit date: 7/8/25  Admit time:   8:22 PM  Discharge Date: 7/10/2025  1:20 PM     Admitting Physician: Svetlana Treviño MD  Attending Physician:  No att. providers found  Primary Care Physician: Slade Yi MD       REVIEWER COMMENTS      Discharge disposition: Home or Self Care

## (undated) DEVICE — GLOVE SUR 8 SENSICARE PI PIP GRN PWD F

## (undated) DEVICE — SOLUTION IRRIG 1000ML 0.9% NACL USP BTL

## (undated) DEVICE — DRAPE SURG L W60XL84IN SMS POLYPR U SHP FLM

## (undated) DEVICE — PACK UPPER EXTREMITY

## (undated) DEVICE — Device

## (undated) DEVICE — GLOVE SUR 8 SENSICARE PI PIP CRM PWD F

## (undated) DEVICE — CUFF TRNQT 3IN X 18IN RED CYL SGL BLDR 2 PRT

## (undated) DEVICE — OINTMENT SKIN 3 ANTIBIO BACI ZN NEOMYCN SULF

## (undated) DEVICE — GLOVE SUR 7.5 SENSICARE PI PIP CRM PWD F

## (undated) DEVICE — PADDING CAST 3INX4YD 100% COT ABSRB HIGHLY

## (undated) DEVICE — SLEEVE COMPR MD KNEE LEN SGL USE KENDALL SCD

## (undated) NOTE — LETTER
Date & Time: 7/6/2025, 2:13 PM  Patient: Edi Aguirre  Encounter Provider(s):    Prieto Forbes MD Warmac, Nicole M., APRN       To Whom It May Concern:    Edi Aguirre was seen and treated in our department on 7/6/2025. He should not return to work until 07/09/25.    If you have any questions or concerns, please do not hesitate to call.        Electronically signed by INO Almeida  _____________________________  Physician/APC Signature

## (undated) NOTE — LETTER
[address]  [address]  Authorization for Surgical Operation and Procedure     Date:___________                                                                                                         Time:__________  I hereby authorize Surgeon(s):  Matthew Oneal MD, my physician and his/her assistants (if applicable), which may include medical students, residents, and/or fellows, to perform the following surgical operation/ procedure and administer such anesthesia as may be determined necessary by my physician:  Operation/Procedure name (s) Procedure(s):  RIGHT FOREARM IRRIGATION & DEBRIDEMENT on Edi Patush   2.   I recognize that during the surgical operation/procedure, unforeseen conditions may necessitate additional or different procedures than those listed above.  I, therefore, further authorize and request that the above-named surgeon, assistants, or designees perform such procedures as are, in their judgment, necessary and desirable.    3.   My surgeon/physician has discussed prior to my surgery the potential benefits, risks and side effects of this procedure; the likelihood of achieving goals; and potential problems that might occur during recuperation.  They also discussed reasonable alternatives to the procedure, including risks, benefits, and side effects related to the alternatives and risks related to not receiving this procedure.  I have had all my questions answered and I acknowledge that no guarantee has been made as to the result that may be obtained.    4.   Should the need arise during my operation/procedure, which includes change of level of care prior to discharge, I also consent to the administration of blood and/or blood products.  Further, I understand that despite careful testing and screening of blood or blood products by collecting agencies, I may still be subject to ill effects as a result of receiving a blood transfusion and/or blood products.   The following are some, but not all, of the potential risks that can occur: fever and allergic reactions, hemolytic reactions, transmission of diseases such as Hepatitis, AIDS and Cytomegalovirus (CMV) and fluid overload.  In the event that I wish to have an autologous transfusion of my own blood, or a directed donor transfusion, I will discuss this with my physician.  Check only if Refusing Blood or Blood Products  I understand refusal of blood or blood products as deemed necessary by my physician may have serious consequences to my condition to include possible death. I hereby assume responsibility for my refusal and release the hospital, its personnel, and my physicians from any responsibility for the consequences of my refusal.          o  Refuse      5.   I authorize the use of any specimen, organs, tissues, body parts or foreign objects that may be removed from my body during the operation/procedure for diagnosis, research or teaching purposes and their subsequent disposal by hospital authorities.  I also authorize the release of specimen test results and/or written reports to my treating physician on the hospital medical staff or other referring or consulting physicians involved in my care, at the discretion of the Pathologist or my treating physician.    6.   I consent to the photographing or videotaping of the operations or procedures to be performed, including appropriate portions of my body for medical, scientific, or educational purposes, provided my identity is not revealed by the pictures or by descriptive texts accompanying them.  If the procedure has been photographed/videotaped, the surgeon will obtain the original picture, image, videotape or CD.  The hospital will not be responsible for storage, release or maintenance of the picture, image, tape or CD.    7.   I consent to the presence of a  or observers in the operating room as deemed necessary by my physician or their  designees.    8.   I recognize that in the event my procedure results in extended X-Ray/fluoroscopy time, I may develop a skin reaction.    9. If I have a Do Not Attempt Resuscitation (DNAR) order in place, that status will be suspended while in the operating room, procedural suite, and during the recovery period unless otherwise explicitly stated by me (or a person authorized to consent on my behalf). The surgeon or my attending physician will determine when the applicable recovery period ends for purposes of reinstating the DNAR order.  10. Patients having a sterilization procedure: I understand that if the procedure is successful the results will be permanent and it will therefore be impossible for me to inseminate, conceive, or bear children.  I also understand that the procedure is intended to result in sterility, although the result has not been guaranteed.   11. I acknowledge that my physician has explained sedation/analgesia administration to me including the risk and benefits I consent to the administration of sedation/analgesia as may be necessary or desirable in the judgment of my physician.    I CERTIFY THAT I HAVE READ AND FULLY UNDERSTAND THE ABOVE CONSENT TO OPERATION and/or OTHER PROCEDURE.    _________________________________________  __________________________________  Signature of Patient     Signature of Responsible Person         ___________________________________         Printed Name of Responsible Person           _________________________________                 Relationship to Patient  _________________________________________  ______________________________  Signature of Witness          Date  Time      Patient Name: Edi Aguirre     : 12/10/1995                 Printed: 2025     Medical Record #: [id number]                     Page 1 of [address] [place] [address] [place]  [address]    Consent for Anesthesia    IEdi  Carla agree to be cared for by an anesthesiologist, who is specially trained to monitor me and give me medicine to put me to sleep or keep me comfortable during my procedure    I understand that my anesthesiologist is not an employee or agent of [facility] or [employer]. He or she works for [employer].    As the patient asking for anesthesia services, I agree to:  Allow the anesthesiologist (anesthesia doctor) to give me medicine and do additional procedures as necessary. Some examples are: Starting or using an “IV” to give me medicine, fluids or blood during my procedure, and having a breathing tube placed to help me breathe when I’m asleep (intubation). In the event that my heart stops working properly, I understand that my anesthesiologist will make every effort to sustain my life, unless otherwise directed by [facility] Do Not Resuscitate documents.  Tell my anesthesia doctor before my procedure:  If I am pregnant.  The last time that I ate or drank.  All of the medicines I take (including prescriptions, herbal supplements, and pills I can buy without a prescription (including street drugs/illegal medications). Failure to inform my anesthesiologist about these medicines may increase my risk of anesthetic complications.  If I am allergic to anything or have had a reaction to anesthesia before.  I understand how the anesthesia medicine will help me (benefits).  I understand that with any type of anesthesia medicine there are risks:  The most common risks are: nausea, vomiting, sore throat, muscle soreness, damage to my eyes, mouth, or teeth (from breathing tube placement).  Rare risks include: remembering what happened during my procedure, allergic reactions to medications, injury to my airway, heart, lungs, vision, nerves, or muscles and in extremely rare instances death.  My doctor has explained to me other choices available to me for my care (alternatives).  Pregnant  Patients (“epidural”):  I understand that the risks of having an epidural (medicine given into my back to help control pain during labor), include itching, low blood pressure, difficulty urinating, headache or slowing of the baby’s heart. Very rare risks include infection, bleeding, seizure, irregular heart rhythms and nerve injury.  Regional Anesthesia (“spinal”, “epidural”, & “nerve blocks”):  I understand that rare but potential complications include headache, bleeding, infection, seizure, irregular heart rhythms, and nerve injury.    I can change my mind about having anesthesia services at any time before I get the medicine.    _____________________________________________________________________________  Patient (or Representative) Signature/Relationship to Patient  Date   Time    _____________________________________________________________________________   Name (if used)    Language/Organization   Time    _____________________________________________________________________________  Anesthesiologist Signature     Date   Time  I have discussed the procedure and information above with the patient (or patient’s representative) and answered their questions. The patient or their representative has agreed to have anesthesia services.    _____________________________________________________________________________  Witness        Date   Time  I have verified that the signature is that of the patient or patient’s representative, and that it was signed before the procedure  Patient Name: Edi Aguirre     : 12/10/1995                 Printed: 2025     Medical Record #: [id number]                     Page 2 of 2

## (undated) NOTE — LETTER
July 10, 2025    Patient: Edi Aguirre   Date of Visit: 7/6/2025       To Whom It May Concern:    Edi Aguirre was seen and treated at our hospital on 7/8/25 and had surgery on 7/9/25. He should not return to work until sutures are removed in 2 weeks.    If you have any questions or concerns, please don't hesitate to call.       Encounter Provider(s):    Jose Maria Buitrago PA-C